# Patient Record
Sex: MALE | Race: BLACK OR AFRICAN AMERICAN | NOT HISPANIC OR LATINO | URBAN - METROPOLITAN AREA
[De-identification: names, ages, dates, MRNs, and addresses within clinical notes are randomized per-mention and may not be internally consistent; named-entity substitution may affect disease eponyms.]

---

## 2018-02-21 ENCOUNTER — EMERGENCY (EMERGENCY)
Facility: HOSPITAL | Age: 38
LOS: 1 days | Discharge: HOME | End: 2018-02-21
Attending: EMERGENCY MEDICINE | Admitting: EMERGENCY MEDICINE
Payer: MEDICAID

## 2018-02-21 VITALS
DIASTOLIC BLOOD PRESSURE: 87 MMHG | RESPIRATION RATE: 16 BRPM | HEART RATE: 104 BPM | OXYGEN SATURATION: 100 % | SYSTOLIC BLOOD PRESSURE: 148 MMHG | TEMPERATURE: 100 F

## 2018-02-21 VITALS
OXYGEN SATURATION: 98 % | HEART RATE: 82 BPM | SYSTOLIC BLOOD PRESSURE: 131 MMHG | TEMPERATURE: 99 F | RESPIRATION RATE: 18 BRPM | DIASTOLIC BLOOD PRESSURE: 81 MMHG

## 2018-02-21 PROBLEM — Z00.00 ENCOUNTER FOR PREVENTIVE HEALTH EXAMINATION: Status: ACTIVE | Noted: 2018-02-21

## 2018-02-21 LAB
ALBUMIN SERPL ELPH-MCNC: 4.4 G/DL — SIGNIFICANT CHANGE UP (ref 3.3–5)
ALP SERPL-CCNC: 66 U/L — SIGNIFICANT CHANGE UP (ref 40–120)
ALT FLD-CCNC: 32 U/L — SIGNIFICANT CHANGE UP (ref 4–41)
AST SERPL-CCNC: 27 U/L — SIGNIFICANT CHANGE UP (ref 4–40)
BASOPHILS # BLD AUTO: 0.04 K/UL — SIGNIFICANT CHANGE UP (ref 0–0.2)
BASOPHILS NFR BLD AUTO: 0.9 % — SIGNIFICANT CHANGE UP (ref 0–2)
BASOPHILS NFR SPEC: 0 % — SIGNIFICANT CHANGE UP (ref 0–2)
BILIRUB SERPL-MCNC: 0.4 MG/DL — SIGNIFICANT CHANGE UP (ref 0.2–1.2)
BUN SERPL-MCNC: 11 MG/DL — SIGNIFICANT CHANGE UP (ref 7–23)
CALCIUM SERPL-MCNC: 8.7 MG/DL — SIGNIFICANT CHANGE UP (ref 8.4–10.5)
CHLORIDE SERPL-SCNC: 101 MMOL/L — SIGNIFICANT CHANGE UP (ref 98–107)
CO2 SERPL-SCNC: 26 MMOL/L — SIGNIFICANT CHANGE UP (ref 22–31)
CREAT SERPL-MCNC: 1.19 MG/DL — SIGNIFICANT CHANGE UP (ref 0.5–1.3)
EOSINOPHIL # BLD AUTO: 0.15 K/UL — SIGNIFICANT CHANGE UP (ref 0–0.5)
EOSINOPHIL NFR BLD AUTO: 3.3 % — SIGNIFICANT CHANGE UP (ref 0–6)
EOSINOPHIL NFR FLD: 6.1 % — HIGH (ref 0–6)
GIANT PLATELETS BLD QL SMEAR: PRESENT — SIGNIFICANT CHANGE UP
GLUCOSE SERPL-MCNC: 98 MG/DL — SIGNIFICANT CHANGE UP (ref 70–99)
HCT VFR BLD CALC: 47.3 % — SIGNIFICANT CHANGE UP (ref 39–50)
HGB BLD-MCNC: 16.1 G/DL — SIGNIFICANT CHANGE UP (ref 13–17)
IMM GRANULOCYTES # BLD AUTO: 0.01 # — SIGNIFICANT CHANGE UP
IMM GRANULOCYTES NFR BLD AUTO: 0.2 % — SIGNIFICANT CHANGE UP (ref 0–1.5)
LYMPHOCYTES # BLD AUTO: 1.48 K/UL — SIGNIFICANT CHANGE UP (ref 1–3.3)
LYMPHOCYTES # BLD AUTO: 32.6 % — SIGNIFICANT CHANGE UP (ref 13–44)
LYMPHOCYTES NFR SPEC AUTO: 26.3 % — SIGNIFICANT CHANGE UP (ref 13–44)
MANUAL SMEAR VERIFICATION: SIGNIFICANT CHANGE UP
MCHC RBC-ENTMCNC: 30 PG — SIGNIFICANT CHANGE UP (ref 27–34)
MCHC RBC-ENTMCNC: 34 % — SIGNIFICANT CHANGE UP (ref 32–36)
MCV RBC AUTO: 88.1 FL — SIGNIFICANT CHANGE UP (ref 80–100)
MONOCYTES # BLD AUTO: 0.96 K/UL — HIGH (ref 0–0.9)
MONOCYTES NFR BLD AUTO: 21.1 % — HIGH (ref 2–14)
MONOCYTES NFR BLD: 15.8 % — HIGH (ref 2–9)
MORPHOLOGY BLD-IMP: NORMAL — SIGNIFICANT CHANGE UP
NEUTROPHIL AB SER-ACNC: 42.1 % — LOW (ref 43–77)
NEUTROPHILS # BLD AUTO: 1.9 K/UL — SIGNIFICANT CHANGE UP (ref 1.8–7.4)
NEUTROPHILS NFR BLD AUTO: 41.9 % — LOW (ref 43–77)
NRBC # FLD: 0 — SIGNIFICANT CHANGE UP
PLATELET # BLD AUTO: 141 K/UL — LOW (ref 150–400)
PLATELET COUNT - ESTIMATE: SIGNIFICANT CHANGE UP
PMV BLD: 10.8 FL — SIGNIFICANT CHANGE UP (ref 7–13)
POTASSIUM SERPL-MCNC: 4 MMOL/L — SIGNIFICANT CHANGE UP (ref 3.5–5.3)
POTASSIUM SERPL-SCNC: 4 MMOL/L — SIGNIFICANT CHANGE UP (ref 3.5–5.3)
PROT SERPL-MCNC: 7.9 G/DL — SIGNIFICANT CHANGE UP (ref 6–8.3)
RBC # BLD: 5.37 M/UL — SIGNIFICANT CHANGE UP (ref 4.2–5.8)
RBC # FLD: 13.2 % — SIGNIFICANT CHANGE UP (ref 10.3–14.5)
SODIUM SERPL-SCNC: 140 MMOL/L — SIGNIFICANT CHANGE UP (ref 135–145)
VARIANT LYMPHS # BLD: 9.7 % — SIGNIFICANT CHANGE UP
WBC # BLD: 4.54 K/UL — SIGNIFICANT CHANGE UP (ref 3.8–10.5)
WBC # FLD AUTO: 4.54 K/UL — SIGNIFICANT CHANGE UP (ref 3.8–10.5)

## 2018-02-21 PROCEDURE — 71046 X-RAY EXAM CHEST 2 VIEWS: CPT | Mod: 26

## 2018-02-21 PROCEDURE — 99284 EMERGENCY DEPT VISIT MOD MDM: CPT

## 2018-02-21 RX ORDER — DEXAMETHASONE 0.5 MG/5ML
5 ELIXIR ORAL ONCE
Qty: 0 | Refills: 0 | Status: COMPLETED | OUTPATIENT
Start: 2018-02-21 | End: 2018-02-21

## 2018-02-21 RX ORDER — DIPHENHYDRAMINE HCL 50 MG
25 CAPSULE ORAL ONCE
Qty: 0 | Refills: 0 | Status: COMPLETED | OUTPATIENT
Start: 2018-02-21 | End: 2018-02-21

## 2018-02-21 RX ORDER — SODIUM CHLORIDE 9 MG/ML
1000 INJECTION INTRAMUSCULAR; INTRAVENOUS; SUBCUTANEOUS ONCE
Qty: 0 | Refills: 0 | Status: COMPLETED | OUTPATIENT
Start: 2018-02-21 | End: 2018-02-21

## 2018-02-21 RX ORDER — KETOROLAC TROMETHAMINE 30 MG/ML
30 SYRINGE (ML) INJECTION ONCE
Qty: 0 | Refills: 0 | Status: DISCONTINUED | OUTPATIENT
Start: 2018-02-21 | End: 2018-02-21

## 2018-02-21 RX ADMIN — Medication 25 MILLIGRAM(S): at 16:10

## 2018-02-21 RX ADMIN — Medication 30 MILLIGRAM(S): at 16:10

## 2018-02-21 RX ADMIN — Medication 5 MILLIGRAM(S): at 16:09

## 2018-02-21 RX ADMIN — SODIUM CHLORIDE 2000 MILLILITER(S): 9 INJECTION INTRAMUSCULAR; INTRAVENOUS; SUBCUTANEOUS at 16:10

## 2018-02-21 NOTE — ED PROVIDER NOTE - PLAN OF CARE
Seen in ED for cough, likely viral upper respiratory tract infection.  Please drink plenty of fluids and follow the instructions below:   1)	If you have fever greater than 100F or generalized bodyaches then please take Tylenol 650 mg every 4 hours and Motrin 800 mg every 6 hours.   2)	If you have head congestion, sinus pressure, or nasal congestion then please take Allegra 120-180 mg every 24 hours or Zyrtec use as directed.   3)	If you have throat discomfort please take cough drops, Cepacol, or Chloraseptic spray.   4)	If you have persistent dry cough please take Delsym or Robitussin.   5)	If you have difficulty bringing up mucus please take Mucinex use as directed.   6)	Make a follow-up appointment with your primary doctor.   7)	Return to ED for any new or worsening symptoms.

## 2018-02-21 NOTE — ED PROVIDER NOTE - CARE PLAN
Principal Discharge DX:	Viral upper respiratory tract infection  Assessment and plan of treatment:	Seen in ED for cough, likely viral upper respiratory tract infection.  Please drink plenty of fluids and follow the instructions below:   1)	If you have fever greater than 100F or generalized bodyaches then please take Tylenol 650 mg every 4 hours and Motrin 800 mg every 6 hours.   2)	If you have head congestion, sinus pressure, or nasal congestion then please take Allegra 120-180 mg every 24 hours or Zyrtec use as directed.   3)	If you have throat discomfort please take cough drops, Cepacol, or Chloraseptic spray.   4)	If you have persistent dry cough please take Delsym or Robitussin.   5)	If you have difficulty bringing up mucus please take Mucinex use as directed.   6)	Make a follow-up appointment with your primary doctor.   7)	Return to ED for any new or worsening symptoms.

## 2018-02-21 NOTE — ED PROVIDER NOTE - ATTENDING CONTRIBUTION TO CARE
Dr. Aleman: I performed the initial face to face bedside interview with this patient regarding history of present illness, review of symptoms and past medical, social and family history.  I completed an independent physical examination.  I was the initial provider who evaluated this patient.  The history, review of symptoms and examination was documented by the scribe in my presence and I attest to the accuracy of the documentation.  I have signed out the follow up of any pending tests (i.e. labs, radiological studies) to the PA.  I have discussed the patient’s plan of care and disposition with the PA.

## 2018-02-21 NOTE — ED ADULT NURSE NOTE - OBJECTIVE STATEMENT
Pt rcvd in surge, aox3, ambulatory, c/o flu-like symptoms since Saturday, cough, fevers, body aches. Pt denies any chest pain, SOB. MD [place] done, 20G placed on R AC, labs sent,, meds given, moved to RW, nad.

## 2018-02-21 NOTE — ED PROVIDER NOTE - OBJECTIVE STATEMENT
15:16 Ash att: 37M neg pmh c/o fever, cough, and body aches x 3-4 days. Past four days patient notes fever tmax 101-102, taking motrin 400 mg q4h with some relief. Patient notes non productive cough, improvement with Nyquil and mucinex. Denies sob, exertional dyspnea, asthma, smoking, travel, travel contact, sick contact, recent abx or hospitalization. PMH x PSH x MED x ALL x SMOKE PCP PHARMACY

## 2018-02-21 NOTE — ED PROVIDER NOTE - PROGRESS NOTE DETAILS
GARY Brock: Labs and xray reviewed with Dr. Aleman. No signs of PNA. Pt stable for discharge and to follow up with pmd.

## 2018-03-23 ENCOUNTER — EMERGENCY (EMERGENCY)
Facility: HOSPITAL | Age: 38
LOS: 1 days | Discharge: HOME | End: 2018-03-23
Attending: EMERGENCY MEDICINE | Admitting: EMERGENCY MEDICINE
Payer: MEDICAID

## 2018-03-23 VITALS
SYSTOLIC BLOOD PRESSURE: 148 MMHG | TEMPERATURE: 98 F | RESPIRATION RATE: 16 BRPM | DIASTOLIC BLOOD PRESSURE: 86 MMHG | HEART RATE: 80 BPM | OXYGEN SATURATION: 100 %

## 2018-03-23 PROCEDURE — 99284 EMERGENCY DEPT VISIT MOD MDM: CPT

## 2018-03-23 NOTE — ED ADULT TRIAGE NOTE - CHIEF COMPLAINT QUOTE
Pt walk in c/o Pain on Left Knee and Back area for 2 weeks. Swelling Warm to touch and Pain is worse today. Travel to [place] last week, Denies Trauma

## 2018-03-24 VITALS
OXYGEN SATURATION: 100 % | RESPIRATION RATE: 16 BRPM | SYSTOLIC BLOOD PRESSURE: 124 MMHG | TEMPERATURE: 98 F | DIASTOLIC BLOOD PRESSURE: 75 MMHG | HEART RATE: 78 BPM

## 2018-03-24 PROCEDURE — 93971 EXTREMITY STUDY: CPT | Mod: 26,LT

## 2018-03-24 PROCEDURE — 73564 X-RAY EXAM KNEE 4 OR MORE: CPT | Mod: 26,LT

## 2018-03-24 RX ORDER — KETOROLAC TROMETHAMINE 30 MG/ML
30 SYRINGE (ML) INJECTION ONCE
Qty: 0 | Refills: 0 | Status: DISCONTINUED | OUTPATIENT
Start: 2018-03-24 | End: 2018-03-24

## 2018-03-24 RX ORDER — ACETAMINOPHEN 500 MG
650 TABLET ORAL ONCE
Qty: 0 | Refills: 0 | Status: COMPLETED | OUTPATIENT
Start: 2018-03-24 | End: 2018-03-24

## 2018-03-24 RX ADMIN — Medication 650 MILLIGRAM(S): at 00:32

## 2018-03-24 RX ADMIN — Medication 30 MILLIGRAM(S): at 00:32

## 2018-03-24 NOTE — ED PROVIDER NOTE - ATTENDING CONTRIBUTION TO CARE
Dr. Dan:  I have personally performed a face to face bedside history and physical examination of this patient. I have discussed the history, examination, review of systems, assessment and plan of management with the resident. I have reviewed the electronic medical record and amended it to reflect my history, review of systems, physical exam, assessment and plan.    38M denies pmh presents with two weeks of left knee pain and swelling.  Had recent travel to [place] and cruise to [place].  Denies specific trauma although recent increase in cycling activity. ROS otherwise negative    Exam:  - TTP at popliteal fossa, mild effusion at left knee, no calf tenderness, distally neurovascularly intact; able to range joint, no erythema/warmth    A/P  - left knee pain  - XR, US to r/o DVT

## 2018-03-24 NOTE — ED ADULT NURSE NOTE - CHPI ED SYMPTOMS NEG
no tingling/no dizziness/no weakness/no chills/no decreased eating/drinking/no fever/no numbness/no vomiting/no nausea

## 2018-03-24 NOTE — ED ADULT NURSE NOTE - OBJECTIVE STATEMENT
received to room 13. complains of left knee pain and swelling for past 2 weeks. states thinks injured knee while running on treadmill. pain is worse with movement but has full ROM. denies any long flights/car rides, calf pain/swelling, chest pain, sob. awaits xray in no acute distress with family at bedside.

## 2018-03-24 NOTE — ED PROVIDER NOTE - OBJECTIVE STATEMENT
37yo male with no pmh presents with left knee pain for 2 weeks. Pt states for the past 2 weeks started to have left knee pain, swollen past couple of days, worse earlier today. Pt states pain at pop fossa. Pt states he went to [place] and cruise to [place]. Pt has taken tylenol and ibuprofen for the pain. Pt denies trauma but states a couple of weeks ago was cycling. Pt denies fevers/chills, all other symptoms, States he placed compression stocking which decreased the swelling.

## 2018-03-24 NOTE — ED ADULT NURSE NOTE - CHIEF COMPLAINT QUOTE
Pt walk in c/o Pain on Left Knee and Back area for 2 weeks. Swelling Warm to touch and Pain is worse today. Travel to [place] last week, Denies Trauma

## 2019-05-12 ENCOUNTER — EMERGENCY (EMERGENCY)
Facility: HOSPITAL | Age: 39
LOS: 1 days | Discharge: HOME | End: 2019-05-12
Attending: EMERGENCY MEDICINE | Admitting: EMERGENCY MEDICINE
Payer: MEDICAID

## 2019-05-12 VITALS
DIASTOLIC BLOOD PRESSURE: 84 MMHG | HEART RATE: 71 BPM | OXYGEN SATURATION: 100 % | SYSTOLIC BLOOD PRESSURE: 151 MMHG | TEMPERATURE: 98 F | RESPIRATION RATE: 14 BRPM

## 2019-05-12 VITALS
SYSTOLIC BLOOD PRESSURE: 146 MMHG | WEIGHT: 250 LBS | RESPIRATION RATE: 16 BRPM | OXYGEN SATURATION: 100 % | DIASTOLIC BLOOD PRESSURE: 96 MMHG | HEART RATE: 92 BPM | TEMPERATURE: 98 F | HEIGHT: 70 IN

## 2019-05-12 LAB
APPEARANCE UR: SIGNIFICANT CHANGE UP
BILIRUB UR-MCNC: NEGATIVE — SIGNIFICANT CHANGE UP
BLOOD UR QL VISUAL: SIGNIFICANT CHANGE UP
COLOR SPEC: YELLOW — SIGNIFICANT CHANGE UP
GLUCOSE UR-MCNC: NEGATIVE — SIGNIFICANT CHANGE UP
KETONES UR-MCNC: NEGATIVE — SIGNIFICANT CHANGE UP
LEUKOCYTE ESTERASE UR-ACNC: HIGH
NITRITE UR-MCNC: NEGATIVE — SIGNIFICANT CHANGE UP
PH UR: 7 — SIGNIFICANT CHANGE UP (ref 5–8)
PROT UR-MCNC: 100 — HIGH
RBC CASTS # UR COMP ASSIST: SIGNIFICANT CHANGE UP (ref 0–?)
SP GR SPEC: 1.02 — SIGNIFICANT CHANGE UP (ref 1–1.04)
UROBILINOGEN FLD QL: 2 — SIGNIFICANT CHANGE UP
WBC UR QL: >50 — HIGH (ref 0–?)

## 2019-05-12 PROCEDURE — 76870 US EXAM SCROTUM: CPT | Mod: 26

## 2019-05-12 PROCEDURE — 99284 EMERGENCY DEPT VISIT MOD MDM: CPT | Mod: 25

## 2019-05-12 RX ORDER — ACETAMINOPHEN 500 MG
975 TABLET ORAL ONCE
Refills: 0 | Status: COMPLETED | OUTPATIENT
Start: 2019-05-12 | End: 2019-05-12

## 2019-05-12 RX ORDER — CEFTRIAXONE 500 MG/1
250 INJECTION, POWDER, FOR SOLUTION INTRAMUSCULAR; INTRAVENOUS ONCE
Refills: 0 | Status: COMPLETED | OUTPATIENT
Start: 2019-05-12 | End: 2019-05-12

## 2019-05-12 RX ORDER — AZITHROMYCIN 500 MG/1
1000 TABLET, FILM COATED ORAL ONCE
Refills: 0 | Status: COMPLETED | OUTPATIENT
Start: 2019-05-12 | End: 2019-05-12

## 2019-05-12 RX ORDER — OXYCODONE HYDROCHLORIDE 5 MG/1
5 TABLET ORAL ONCE
Refills: 0 | Status: DISCONTINUED | OUTPATIENT
Start: 2019-05-12 | End: 2019-05-12

## 2019-05-12 RX ADMIN — OXYCODONE HYDROCHLORIDE 5 MILLIGRAM(S): 5 TABLET ORAL at 04:16

## 2019-05-12 RX ADMIN — OXYCODONE HYDROCHLORIDE 5 MILLIGRAM(S): 5 TABLET ORAL at 03:04

## 2019-05-12 RX ADMIN — Medication 975 MILLIGRAM(S): at 03:04

## 2019-05-12 RX ADMIN — AZITHROMYCIN 1000 MILLIGRAM(S): 500 TABLET, FILM COATED ORAL at 06:40

## 2019-05-12 RX ADMIN — Medication 975 MILLIGRAM(S): at 04:16

## 2019-05-12 RX ADMIN — CEFTRIAXONE 250 MILLIGRAM(S): 500 INJECTION, POWDER, FOR SOLUTION INTRAMUSCULAR; INTRAVENOUS at 06:40
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
## 2019-05-12 NOTE — ED PROVIDER NOTE - NSFOLLOWUPINSTRUCTIONS_ED_ALL_ED_FT
1) Please return to the ED should you have any new or worsening symptoms, worsening pain, develop chest pain, difficulty breathing, or any concerning symptoms or acutely worsening pain   2) Please follow up with [facility] in 2-3 days. Please call [phone] to make an appointment.
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
## 2019-05-12 NOTE — ED PROVIDER NOTE - CLINICAL SUMMARY MEDICAL DECISION MAKING FREE TEXT BOX
Alessandro Adamson (Resident): 37 y/o L testicular pain w/ hemospermia - concern for trauma vs infection (orchitis vs epididymitis) - patient looks comfortable at rest, but exquisitely tender testicle w/o much skin changes/bruising - low suspicion for sono given no pain at rest - will pain control, urine, and sono

## 2019-05-12 NOTE — ED ADULT TRIAGE NOTE - CHIEF COMPLAINT QUOTE
Pt arrives to ED c/o swollen left testicle.  Pt reports 10/10 pain and appears uncomfortable in triage.  Pt reports burning during micturition but denies any discharge.  Pt reports pain began four days ago and has gotten progressively worse.  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

## 2019-05-12 NOTE — ED PROVIDER NOTE - PROGRESS NOTE DETAILS
Alessandro Adamson (Resident): US read w/ possible torsion/detorsion w/ swelling of spermatic cord - patient still in pain, worse w/ any movement - will rx for GC/Chlamydia - declined HIV test but accepted RPR - paged urology Alessandro Adamson (Resident): US read w/ possible torsion/detorsion w/ swelling of spermatic cord - patient still in pain but improved - will rx for GC/Chlamydia - declined HIV test but accepted RPR - patient US does show good flow to the testicle, and had urology look at images, and they state that this active infection could cause the non-specific findings of the twisting cord, but the radiologist would not be able to suggest possible torsion/detorsion based on that image and since patient has active flow to testicle, no concern for torsion - recommend patient returns for any acutely worse pain or if symptoms do not improved - provided patient w/ urology follow up - safe to d/c home Alessandro Adamson (Resident): US read w/ possible torsion/detorsion w/ swelling of spermatic cord - patient still in pain but improved - will rx for GC/Chlamydia - ofered patient IM rocephin plus 10 days of doxy, but states he does not think he would remember to take pills for 10 days, so will do azithromycin 1g PO now - declined HIV test but accepted RPR - patient US does show good flow to the testicle, and had urology look at images, and they state that this active infection could cause the non-specific findings of the twisting cord, but the radiologist would not be able to suggest possible torsion/detorsion based on that image and since patient has active flow to testicle, no concern for torsion, especially in setting of active infection - recommend patient returns for any acutely worse pain or if symptoms do not improved - provided patient w/ urology follow up - safe to d/c home - patient understands everything, given chance to ask questions - safe to d/c home Alessandro Adamson (Resident): US read w/ possible torsion/detorsion w/ swelling of spermatic cord - patient still in pain but improved - will rx for GC/Chlamydia - offered patient IM rocephin plus 10 days of doxy, but states he does not think he would remember to take pills for 10 days, so will do azithromycin 1g PO now - declined HIV test but accepted RPR - patient US does show good flow to the testicle, and had urology look at images, and they state that this active infection could cause the non-specific findings of the twisting cord, but the radiologist would not be able to suggest possible torsion/detorsion based on that image and since patient has active flow to testicle, no concern for torsion, especially in setting of active infection - recommend patient returns for any acutely worse pain or if symptoms do not improved - provided patient w/ urology follow up - safe to d/c home - patient understands everything, given chance to ask questions - safe to d/c home

## 2019-05-12 NOTE — ED PROVIDER NOTE - GENITOURINARY, MLM
L testicular swelling, Pain at inferior pole and posterior testicle and pain at inguinal canal. No penile discharge. Circumcised

## 2019-05-12 NOTE — ED ADULT NURSE NOTE - OBJECTIVE STATEMENT
Pt A&ox4, ambulatory, arrives to ED room 22 c/o L testicular pain. pt reports pain increasing, with associated swelling. Intermittent pain with urination. Denies fevers, chills, discharge, hematuria. Pt states pain is constant, however pt appears significantly more uncomfortable with movement. pt was evaluated by ED provider. VSS. Medicated as ordered. Urine samples sent to lab. Pt pending US.

## 2019-05-13 LAB
BACTERIA UR CULT: SIGNIFICANT CHANGE UP
C TRACH RRNA SPEC QL NAA+PROBE: SIGNIFICANT CHANGE UP
N GONORRHOEA RRNA SPEC QL NAA+PROBE: SIGNIFICANT CHANGE UP
SPECIMEN SOURCE: SIGNIFICANT CHANGE UP
SPECIMEN SOURCE: SIGNIFICANT CHANGE UP
T PALLIDUM AB TITR SER: NEGATIVE — SIGNIFICANT CHANGE UP

## 2019-05-14 ENCOUNTER — APPOINTMENT (OUTPATIENT)
Dept: UROLOGY | Facility: CLINIC | Age: 39
End: 2019-05-14
Payer: MEDICAID

## 2019-05-14 ENCOUNTER — LABORATORY RESULT (OUTPATIENT)
Age: 39
End: 2019-05-14

## 2019-05-14 ENCOUNTER — OUTPATIENT (OUTPATIENT)
Dept: OUTPATIENT SERVICES | Facility: HOSPITAL | Age: 39
LOS: 1 days | End: 2019-05-14
Payer: MEDICAID

## 2019-05-14 VITALS — RESPIRATION RATE: 18 BRPM | DIASTOLIC BLOOD PRESSURE: 116 MMHG | SYSTOLIC BLOOD PRESSURE: 176 MMHG | HEART RATE: 93 BPM

## 2019-05-14 DIAGNOSIS — N50.9 DISORDER OF MALE GENITAL ORGANS, UNSPECIFIED: ICD-10-CM

## 2019-05-14 DIAGNOSIS — R35.0 FREQUENCY OF MICTURITION: ICD-10-CM

## 2019-05-14 DIAGNOSIS — R36.1 HEMATOSPERMIA: ICD-10-CM

## 2019-05-14 PROCEDURE — 93975 VASCULAR STUDY: CPT | Mod: 26

## 2019-05-14 PROCEDURE — 99204 OFFICE O/P NEW MOD 45 MIN: CPT | Mod: 25

## 2019-05-14 PROCEDURE — 93975 VASCULAR STUDY: CPT

## 2019-05-14 PROCEDURE — 76870 US EXAM SCROTUM: CPT

## 2019-05-14 PROCEDURE — 76870 US EXAM SCROTUM: CPT | Mod: 26

## 2019-05-14 NOTE — HISTORY OF PRESENT ILLNESS
[FreeTextEntry1] : Patient is a 31-year-old gentleman who presents with the chief complaint of had a large left hemiscrotum for evaluation. I reviewed the questionnaire he had completed with him in detail. Patient had relations with 2 women last week and was concerned about sexually transmitted disease. He had an incident of hematospermia on Friday evening. He was seen in the emergency room on Saturday morning and STD testing was negative. However, he stated that ultrasound done in the ER was positive. He was told that might have been a torsion or missed torsion. He persists with large left hemiscrotum. He has no known drug allergies.  His past medical history demonstrates no significant urologic issues.  In his present occupation he has no known toxin exposure.  He does smoke and drinks only socially.  He has no known drug allergies.  His review of systems is non-contributory. His family history is not significant.\par

## 2019-05-14 NOTE — ASSESSMENT
[FreeTextEntry1] : This pleasant gentleman presents with surgeons regarding an enlarged left hemiscrotum and hematospermia.  I have requested several baseline blood studies and additional imaging.   Urine analysis was requested.  I will make more specific recommendations after the results of the requested tests return.\par

## 2019-05-14 NOTE — PHYSICAL EXAM
[General Appearance - Well Developed] : well developed [General Appearance - Well Nourished] : well nourished [Normal Appearance] : normal appearance [Well Groomed] : well groomed [General Appearance - In No Acute Distress] : no acute distress [Heart Rate And Rhythm] : Heart rate and rhythm were normal [Arterial Pulses Normal] : the pedal pulses were normal [Edema] : no peripheral edema [Respiration, Rhythm And Depth] : normal respiratory rhythm and effort [Exaggerated Use Of Accessory Muscles For Inspiration] : no accessory muscle use [Chest Palpation] : palpation of the chest revealed no abnormalities [Bowel Sounds] : normal bowel sounds [Auscultation Breath Sounds / Voice Sounds] : lungs were clear to auscultation bilaterally [Abdomen Soft] : soft [Abdomen Tenderness] : non-tender [Abdomen Hernia] : no hernia was discovered [Abdomen Mass (___ Cm)] : no abdominal mass palpated [Costovertebral Angle Tenderness] : no ~M costovertebral angle tenderness [Urinary Bladder Findings] : the bladder was normal on palpation [Scrotum] : the scrotum was normal [Urethral Meatus] : meatus normal [Testes Tenderness] : no tenderness of the testes [Rectal Exam - Seminal Vesicles] : the seminal vesicles were normal [Epididymis] : the epididymides were normal [Rectal Exam - Rectum] : digital rectal exam was normal [Testes Mass (___cm)] : there were no testicular masses [Anus Abnormality] : the anus and perineum were normal [Prostate Enlargement] : the prostate was not enlarged [Prostate Tenderness] : the prostate was not tender [No Prostate Nodules] : no prostate nodules [Skin Color & Pigmentation] : normal skin color and pigmentation [Normal Station and Gait] : the gait and station were normal for the patient's age [No Focal Deficits] : no focal deficits [Skin Lesions] : no skin lesions [] : no rash [Skin Turgor] : supple [Motor Exam] : the motor exam was normal [Sensation] : the sensory exam was normal to light touch and pinprick [Affect] : the affect was normal [Mood] : the mood was normal [Oriented To Time, Place, And Person] : oriented to person, place, and time [Not Anxious] : not anxious [Cervical Lymph Nodes Enlarged Posterior Bilaterally] : posterior cervical [No Palpable Adenopathy] : no palpable adenopathy [Femoral Lymph Nodes Enlarged Bilaterally] : femoral [Axillary Lymph Nodes Enlarged Bilaterally] : axillary [Supraclavicular Lymph Nodes Enlarged Bilaterally] : supraclavicular [Cervical Lymph Nodes Enlarged Anterior Bilaterally] : anterior cervical [Inguinal Lymph Nodes Enlarged Bilaterally] : inguinal

## 2019-05-14 NOTE — LETTER BODY
[FreeTextEntry1] : Dear   ,\par \par Thank you for referring your patient Liu Barahona for consultation for left scrotal discomfort and swelling. I have requested several baseline blood studies. I will see the patient back in followup shortly and make further recommendations. I have attached a copy of my consultation note for your records.\par \par Thank you again for this kind referral. I will certainly keep you updated with further progress. Please do not hesitate to call me if you have any questions.\par \par Best regards,\par \par \par \par Jaden Delarosa M.D., PhD\par Professor of Urology\par    [facility]\par Director, Reproductive and Sexual Medicine\par    [facility]\par    [facility]\par

## 2019-05-15 ENCOUNTER — LABORATORY RESULT (OUTPATIENT)
Age: 39
End: 2019-05-15

## 2019-05-17 LAB
ALBUMIN SERPL ELPH-MCNC: 4.2 G/DL
ALP BLD-CCNC: 75 U/L
ALT SERPL-CCNC: 21 U/L
ANION GAP SERPL CALC-SCNC: 14 MMOL/L
APPEARANCE: CLEAR
AST SERPL-CCNC: 19 U/L
BASOPHILS # BLD AUTO: 0.04 K/UL
BASOPHILS NFR BLD AUTO: 0.4 %
BILIRUB SERPL-MCNC: 0.2 MG/DL
BILIRUBIN URINE: NEGATIVE
BLOOD URINE: ABNORMAL
BUN SERPL-MCNC: 11 MG/DL
C TRACH RRNA SPEC QL NAA+PROBE: NOT DETECTED
CALCIUM SERPL-MCNC: 9.1 MG/DL
CHLORIDE SERPL-SCNC: 105 MMOL/L
CHOLEST SERPL-MCNC: 152 MG/DL
CHOLEST/HDLC SERPL: 3.8 RATIO
CO2 SERPL-SCNC: 21 MMOL/L
COLOR: NORMAL
CREAT SERPL-MCNC: 0.87 MG/DL
EOSINOPHIL # BLD AUTO: 0.17 K/UL
EOSINOPHIL NFR BLD AUTO: 1.9 %
FSH SERPL-MCNC: 9.9 IU/L
GLUCOSE QUALITATIVE U: NEGATIVE
GLUCOSE SERPL-MCNC: 89 MG/DL
HCT VFR BLD CALC: 46 %
HDLC SERPL-MCNC: 40 MG/DL
HGB BLD-MCNC: 14.9 G/DL
HIV1+2 AB SPEC QL IA.RAPID: NONREACTIVE
IMM GRANULOCYTES NFR BLD AUTO: 0.3 %
KETONES URINE: NEGATIVE
LDLC SERPL CALC-MCNC: 91 MG/DL
LEUKOCYTE ESTERASE URINE: ABNORMAL
LH SERPL-ACNC: 10.1 IU/L
LYMPHOCYTES # BLD AUTO: 2.57 K/UL
LYMPHOCYTES NFR BLD AUTO: 28.7 %
MAN DIFF?: NORMAL
MCHC RBC-ENTMCNC: 28.9 PG
MCHC RBC-ENTMCNC: 32.4 GM/DL
MCV RBC AUTO: 89.1 FL
MONOCYTES # BLD AUTO: 0.78 K/UL
MONOCYTES NFR BLD AUTO: 8.7 %
N GONORRHOEA RRNA SPEC QL NAA+PROBE: NOT DETECTED
NEUTROPHILS # BLD AUTO: 5.38 K/UL
NEUTROPHILS NFR BLD AUTO: 60 %
NITRITE URINE: NEGATIVE
PH URINE: 7.5
PLATELET # BLD AUTO: 177 K/UL
POTASSIUM SERPL-SCNC: 4.1 MMOL/L
PROT SERPL-MCNC: 7.4 G/DL
PROTEIN URINE: NEGATIVE
PSA SERPL-MCNC: 2.4 NG/ML
RBC # BLD: 5.16 M/UL
RBC # FLD: 13.3 %
SODIUM SERPL-SCNC: 140 MMOL/L
SOURCE AMPLIFICATION: NORMAL
SPECIFIC GRAVITY URINE: 1.02
T PALLIDUM AB SER QL IA: NEGATIVE
TESTOST BND SERPL-MCNC: 5.9 PG/ML
TESTOST SERPL-MCNC: 419.2 NG/DL
TRIGL SERPL-MCNC: 105 MG/DL
TSH SERPL-ACNC: 2.84 UIU/ML
UROBILINOGEN URINE: NORMAL
WBC # FLD AUTO: 8.97 K/UL

## 2019-05-20 DIAGNOSIS — R36.1 HEMATOSPERMIA: ICD-10-CM

## 2019-05-20 DIAGNOSIS — N50.9 DISORDER OF MALE GENITAL ORGANS, UNSPECIFIED: ICD-10-CM

## 2019-05-30 ENCOUNTER — APPOINTMENT (OUTPATIENT)
Dept: UROLOGY | Facility: CLINIC | Age: 39
End: 2019-05-30

## 2020-03-19 NOTE — ED PROVIDER NOTE - TOBACCO USE
Gastro specimen pending results.         ----- Message from Radha Valenzuela sent at 3/19/2020  2:44 PM CDT -----  Contact: Patient  Type: Needs Medical Advice    Who Called:  Patient  Best Call Back Number:   Additional Information: Calling to speak with the nurse to get the results of her biopsy.      
Never smoker

## 2021-06-17 ENCOUNTER — EMERGENCY (EMERGENCY)
Facility: HOSPITAL | Age: 41
LOS: 1 days | Discharge: HOME | End: 2021-06-17
Attending: EMERGENCY MEDICINE | Admitting: EMERGENCY MEDICINE
Payer: MEDICAID

## 2021-06-17 VITALS
SYSTOLIC BLOOD PRESSURE: 127 MMHG | HEART RATE: 82 BPM | OXYGEN SATURATION: 97 % | RESPIRATION RATE: 17 BRPM | DIASTOLIC BLOOD PRESSURE: 74 MMHG | TEMPERATURE: 99 F

## 2021-06-17 VITALS
OXYGEN SATURATION: 100 % | SYSTOLIC BLOOD PRESSURE: 153 MMHG | WEIGHT: 265 LBS | RESPIRATION RATE: 16 BRPM | HEART RATE: 92 BPM | HEIGHT: 72 IN | TEMPERATURE: 99 F | DIASTOLIC BLOOD PRESSURE: 91 MMHG

## 2021-06-17 LAB
ALBUMIN SERPL ELPH-MCNC: 3.5 G/DL — SIGNIFICANT CHANGE UP (ref 3.3–5)
ALP SERPL-CCNC: 72 U/L — SIGNIFICANT CHANGE UP (ref 30–120)
ALT FLD-CCNC: 30 U/L DA — SIGNIFICANT CHANGE UP (ref 10–60)
ANION GAP SERPL CALC-SCNC: 9 MMOL/L — SIGNIFICANT CHANGE UP (ref 5–17)
APPEARANCE UR: CLEAR — SIGNIFICANT CHANGE UP
AST SERPL-CCNC: 36 U/L — SIGNIFICANT CHANGE UP (ref 10–40)
BACTERIA # UR AUTO: ABNORMAL
BASOPHILS # BLD AUTO: 0.04 K/UL — SIGNIFICANT CHANGE UP (ref 0–0.2)
BASOPHILS NFR BLD AUTO: 0.8 % — SIGNIFICANT CHANGE UP (ref 0–2)
BILIRUB SERPL-MCNC: 0.6 MG/DL — SIGNIFICANT CHANGE UP (ref 0.2–1.2)
BILIRUB UR-MCNC: NEGATIVE — SIGNIFICANT CHANGE UP
BUN SERPL-MCNC: 8 MG/DL — SIGNIFICANT CHANGE UP (ref 7–23)
CALCIUM SERPL-MCNC: 8.4 MG/DL — SIGNIFICANT CHANGE UP (ref 8.4–10.5)
CHLORIDE SERPL-SCNC: 101 MMOL/L — SIGNIFICANT CHANGE UP (ref 96–108)
CO2 SERPL-SCNC: 24 MMOL/L — SIGNIFICANT CHANGE UP (ref 22–31)
COLOR SPEC: YELLOW — SIGNIFICANT CHANGE UP
CREAT SERPL-MCNC: 1.1 MG/DL — SIGNIFICANT CHANGE UP (ref 0.5–1.3)
DIFF PNL FLD: ABNORMAL
EOSINOPHIL # BLD AUTO: 0.08 K/UL — SIGNIFICANT CHANGE UP (ref 0–0.5)
EOSINOPHIL NFR BLD AUTO: 1.5 % — SIGNIFICANT CHANGE UP (ref 0–6)
EPI CELLS # UR: SIGNIFICANT CHANGE UP
GLUCOSE SERPL-MCNC: 91 MG/DL — SIGNIFICANT CHANGE UP (ref 70–99)
GLUCOSE UR QL: NEGATIVE MG/DL — SIGNIFICANT CHANGE UP
HADV DNA SPEC QL NAA+PROBE: DETECTED
HCT VFR BLD CALC: 46.8 % — SIGNIFICANT CHANGE UP (ref 39–50)
HGB BLD-MCNC: 15.9 G/DL — SIGNIFICANT CHANGE UP (ref 13–17)
IMM GRANULOCYTES NFR BLD AUTO: 0.6 % — SIGNIFICANT CHANGE UP (ref 0–1.5)
KETONES UR-MCNC: NEGATIVE — SIGNIFICANT CHANGE UP
LEUKOCYTE ESTERASE UR-ACNC: ABNORMAL
LIDOCAIN IGE QN: 97 U/L — SIGNIFICANT CHANGE UP (ref 73–393)
LYMPHOCYTES # BLD AUTO: 1.58 K/UL — SIGNIFICANT CHANGE UP (ref 1–3.3)
LYMPHOCYTES # BLD AUTO: 30.5 % — SIGNIFICANT CHANGE UP (ref 13–44)
MCHC RBC-ENTMCNC: 29.5 PG — SIGNIFICANT CHANGE UP (ref 27–34)
MCHC RBC-ENTMCNC: 34 GM/DL — SIGNIFICANT CHANGE UP (ref 32–36)
MCV RBC AUTO: 86.8 FL — SIGNIFICANT CHANGE UP (ref 80–100)
MONOCYTES # BLD AUTO: 0.85 K/UL — SIGNIFICANT CHANGE UP (ref 0–0.9)
MONOCYTES NFR BLD AUTO: 16.4 % — HIGH (ref 2–14)
NEUTROPHILS # BLD AUTO: 2.6 K/UL — SIGNIFICANT CHANGE UP (ref 1.8–7.4)
NEUTROPHILS NFR BLD AUTO: 50.2 % — SIGNIFICANT CHANGE UP (ref 43–77)
NITRITE UR-MCNC: NEGATIVE — SIGNIFICANT CHANGE UP
NRBC # BLD: 0 /100 WBCS — SIGNIFICANT CHANGE UP (ref 0–0)
PH UR: 6 — SIGNIFICANT CHANGE UP (ref 5–8)
PLATELET # BLD AUTO: 118 K/UL — LOW (ref 150–400)
POTASSIUM SERPL-MCNC: 4.1 MMOL/L — SIGNIFICANT CHANGE UP (ref 3.5–5.3)
POTASSIUM SERPL-SCNC: 4.1 MMOL/L — SIGNIFICANT CHANGE UP (ref 3.5–5.3)
PROT SERPL-MCNC: 7.9 G/DL — SIGNIFICANT CHANGE UP (ref 6–8.3)
PROT UR-MCNC: 30 MG/DL
RAPID RVP RESULT: DETECTED
RBC # BLD: 5.39 M/UL — SIGNIFICANT CHANGE UP (ref 4.2–5.8)
RBC # FLD: 13.8 % — SIGNIFICANT CHANGE UP (ref 10.3–14.5)
RBC CASTS # UR COMP ASSIST: SIGNIFICANT CHANGE UP /HPF (ref 0–4)
SARS-COV-2 RNA SPEC QL NAA+PROBE: SIGNIFICANT CHANGE UP
SODIUM SERPL-SCNC: 134 MMOL/L — LOW (ref 135–145)
SP GR SPEC: 1.02 — SIGNIFICANT CHANGE UP (ref 1.01–1.02)
UROBILINOGEN FLD QL: NEGATIVE MG/DL — SIGNIFICANT CHANGE UP
WBC # BLD: 5.2 K/UL — SIGNIFICANT CHANGE UP (ref 3.8–10.5)
WBC # FLD AUTO: 5.2 K/UL — SIGNIFICANT CHANGE UP (ref 3.8–10.5)
WBC UR QL: NEGATIVE — SIGNIFICANT CHANGE UP

## 2021-06-17 PROCEDURE — 80053 COMPREHEN METABOLIC PANEL: CPT

## 2021-06-17 PROCEDURE — 96361 HYDRATE IV INFUSION ADD-ON: CPT

## 2021-06-17 PROCEDURE — 81001 URINALYSIS AUTO W/SCOPE: CPT

## 2021-06-17 PROCEDURE — 83690 ASSAY OF LIPASE: CPT

## 2021-06-17 PROCEDURE — 96375 TX/PRO/DX INJ NEW DRUG ADDON: CPT

## 2021-06-17 PROCEDURE — 87086 URINE CULTURE/COLONY COUNT: CPT

## 2021-06-17 PROCEDURE — 96374 THER/PROPH/DIAG INJ IV PUSH: CPT | Mod: XU

## 2021-06-17 PROCEDURE — 71046 X-RAY EXAM CHEST 2 VIEWS: CPT

## 2021-06-17 PROCEDURE — 85025 COMPLETE CBC W/AUTO DIFF WBC: CPT

## 2021-06-17 PROCEDURE — 36415 COLL VENOUS BLD VENIPUNCTURE: CPT

## 2021-06-17 PROCEDURE — 99284 EMERGENCY DEPT VISIT MOD MDM: CPT

## 2021-06-17 PROCEDURE — 74177 CT ABD & PELVIS W/CONTRAST: CPT

## 2021-06-17 PROCEDURE — 74177 CT ABD & PELVIS W/CONTRAST: CPT | Mod: 26,MA

## 2021-06-17 PROCEDURE — 99284 EMERGENCY DEPT VISIT MOD MDM: CPT | Mod: 25

## 2021-06-17 PROCEDURE — 71046 X-RAY EXAM CHEST 2 VIEWS: CPT | Mod: 26

## 2021-06-17 PROCEDURE — 0225U NFCT DS DNA&RNA 21 SARSCOV2: CPT

## 2021-06-17 RX ORDER — KETOROLAC TROMETHAMINE 30 MG/ML
15 SYRINGE (ML) INJECTION ONCE
Refills: 0 | Status: DISCONTINUED | OUTPATIENT
Start: 2021-06-17 | End: 2021-06-17

## 2021-06-17 RX ORDER — SODIUM CHLORIDE 9 MG/ML
1000 INJECTION INTRAMUSCULAR; INTRAVENOUS; SUBCUTANEOUS ONCE
Refills: 0 | Status: COMPLETED | OUTPATIENT
Start: 2021-06-17 | End: 2021-06-17

## 2021-06-17 RX ORDER — ONDANSETRON 8 MG/1
4 TABLET, FILM COATED ORAL ONCE
Refills: 0 | Status: COMPLETED | OUTPATIENT
Start: 2021-06-17 | End: 2021-06-17

## 2021-06-17 RX ORDER — ACETAMINOPHEN 500 MG
650 TABLET ORAL ONCE
Refills: 0 | Status: COMPLETED | OUTPATIENT
Start: 2021-06-17 | End: 2021-06-17

## 2021-06-17 RX ORDER — AMLODIPINE BESYLATE 2.5 MG/1
1 TABLET ORAL
Qty: 0 | Refills: 0 | DISCHARGE

## 2021-06-17 RX ADMIN — Medication 650 MILLIGRAM(S): at 12:09

## 2021-06-17 RX ADMIN — SODIUM CHLORIDE 1000 MILLILITER(S): 9 INJECTION INTRAMUSCULAR; INTRAVENOUS; SUBCUTANEOUS at 12:09

## 2021-06-17 RX ADMIN — Medication 650 MILLIGRAM(S): at 12:55

## 2021-06-17 RX ADMIN — ONDANSETRON 4 MILLIGRAM(S): 8 TABLET, FILM COATED ORAL at 12:09

## 2021-06-17 RX ADMIN — Medication 15 MILLIGRAM(S): at 12:55

## 2021-06-17 RX ADMIN — SODIUM CHLORIDE 1000 MILLILITER(S): 9 INJECTION INTRAMUSCULAR; INTRAVENOUS; SUBCUTANEOUS at 13:10

## 2021-06-17 RX ADMIN — Medication 15 MILLIGRAM(S): at 12:09

## 2021-06-17 NOTE — ED PROVIDER NOTE - CLINICAL SUMMARY MEDICAL DECISION MAKING FREE TEXT BOX
mult complaints including weakness, fatigue, diarrhea, and cough. low grade temp in ED. differentials include but not limited to covid, viral infection, pneumonia, colitis, diverticulitis, appendicitis. will check labs, CXR, CT abdomen, tylenol, IVF. no hypoxia, tachycardia, or tachypnea Cleo: mult complaints including weakness, fatigue, diarrhea, and cough. low grade temp in ED. differentials include but not limited to covid, viral infection, pneumonia, colitis, diverticulitis, appendicitis. will check labs, CXR, CT abdomen, tylenol, IVF. no hypoxia, tachycardia, or tachypnea.  ED work up shows possible LLL pna and possible inflammatory bowel disease for which outpatient GI referral is provided.

## 2021-06-17 NOTE — ED ADULT NURSE NOTE - CHPI ED NUR SYMPTOMS NEG
no chest pain/no chills/no diaphoresis/no edema/no headache/no hemoptysis/no shortness of breath/no wheezing

## 2021-06-17 NOTE — ED PROVIDER NOTE - PATIENT PORTAL LINK FT
You can access the FollowMyHealth Patient Portal offered by [facility] by registering at the following website: [email or web address]. By joining [facility]’s FollowMyHealth portal, you will also be able to view your health information using other applications (apps) compatible with our system.

## 2021-06-17 NOTE — ED PROVIDER NOTE - PROVIDER TOKENS
PROVIDER:[TOKEN:[[id number]],FOLLOWUP:[1-3 Days]] PROVIDER:[TOKEN:[[id number]],FOLLOWUP:[1-3 Days]],PROVIDER:[TOKEN:[[id number]],FOLLOWUP:[1-3 Days]]

## 2021-06-17 NOTE — ED PROVIDER NOTE - NSFOLLOWUPINSTRUCTIONS_ED_ALL_ED_FT
drink plenty of fluids, advance to BRAT diet as tolerated  continue zithromax that your started yesterday, also start Augmentin twice a day  tylenol or motrin over the counter for fever  have close follow up with primary care provider   follow up with GI regarding suspected finding of inflammatory bowel disease, referral provided         Adenovirus Infection, Adult      Adenoviruses are common viruses that cause many types of infections. These viruses may affect the nose, throat, windpipe, and lungs (respiratory system), as well as other parts of the body, including the eyes, stomach, bowels, bladder, and brain. The most common type of adenovirus infection is the common cold.    Usually, adenovirus infections are not severe. However, they can become severe in people who have another health problem that makes it hard to fight off infection.      What are the causes?  This condition is caused by an adenovirus entering your body. Some ways this can happen are:  •Touching a surface or object that has an adenovirus on it and then touching your mouth, nose, or eyes with unwashed hands.      •Coming in close physical contact with someone who has this type of infection. This may happen if you hug or shake hands with the person.      •Breathing in droplets that fly through the air when someone who has the infection talks, coughs, or sneezes.      •Having contact with stool (feces) that has the virus in it.      •Using a swimming pool that does not have enough chlorine in it. Chlorine is a chemical that kills germs.      Adenoviruses can live outside the body for a long time. They spread easily from person to person (are contagious).      What increases the risk?  The following factors may make you more likely to develop this condition:  •Spending a lot of time in places where there are many people. These include schools, summer camps, day care centers, community centers, and training centers for people who join the .      •Being an older adult.      •Having a weak immune system. This is the body's defense system.      •Having a disease of the respiratory system.      •Having a heart condition.        What are the signs or symptoms?  Adenovirus infections usually cause flu-like symptoms. When the virus enters the body, symptoms of this condition can take up to 14 days to develop. Symptoms may include:•Having lung and breathing problems, such as:  •Cough.      •Trouble breathing.      •Runny nose or stuffy (congested) nose.      •Feeling aches and pains, including:  •Headache.      •Stiff neck.      •Sore throat.      •Ear pain or congested ears.      •Stomachache.      •Having digestive problems, such as:  •Feeling nauseous or vomiting.      •Having diarrhea.        •Having a fever.      •Having eye problems, such as pink eye (conjunctivitis), causing inflammation and redness.      •Rash.    •Less common symptoms include:  •Being confused or not knowing the time of day or where you are (disoriented).      •Having blood in your urine or having pain while urinating.          How is this diagnosed?  This condition may be diagnosed based on your symptoms and a physical exam. Your health care provider may order tests to make sure your symptoms are not caused by another problem. Tests can include:  •Blood tests.      •Urine tests.      •Stool tests.      •Chest X-ray.      •Tests of tissue or mucus from your throat.        How is this treated?  This condition goes away on its own with time. Treatment for this condition involves managing symptoms until they go away. Your health care provider may recommend:  •Getting plenty of rest.      •Drinking more fluids than usual.      •Taking over-the-counter medicine to help relieve a sore throat, fever, or headache.        Follow these instructions at home:     Lifestyle     • Do not drink alcohol.      • Do not use any products that contain nicotine or tobacco, such as cigarettes, e-cigarettes, and chewing tobacco. If you need help quitting, ask your health care provider.      General instructions     •Take over-the-counter and prescription medicines only as told by your health care provider.      •Rest at home until your symptoms go away.      •Drink enough fluid to keep your urine pale yellow.      •If you have a sore throat, gargle with a salt-water mixture 3–4 times a day or as needed. To make a salt-water mixture, completely dissolve ½–1 tsp (3–6 g) of salt in 1 cup (237 mL) of warm water.      •Keep all follow-up visits as told by your health care provider. This is important.      •Return to your normal activities as told by your health care provider. Ask your health care provider what activities are safe for you.        How is this prevented?                Adenoviruses often are not killed by cleaning products and can remain on surfaces for a long time. To help prevent becoming infected or spreading infection:  •Wash your hands often with soap and water. If soap and water are not available, use hand .      •Cover your mouth when you cough. Cover your nose and mouth when you sneeze.      •Do not touch your eyes, nose, or mouth with unwashed hands, and wash hands after touching these areas.      •Clean commonly used objects often.      •Do not use a swimming pool that does not have enough chlorine in it.      •Avoid close contact with people who are sick.      •Do not go to school or work when you are sick.      •Do not share cups or eating utensils.        Where to find more information    •Centers for Disease Control and Prevention: www.cdc.gov        Contact a health care provider if:    •Your symptoms stay the same after 10 days.      •Your symptoms get worse.      •You cannot eat or drink without vomiting.        Get help right away if:    •You have trouble breathing or you are breathing quickly.      •Your skin, lips, or fingernails look blue.      •Your heart is beating fast.      •You become confused.      •You lose consciousness.      These symptoms may represent a serious problem that is an emergency. Do not wait to see if the symptoms will go away. Get medical help right away. Call your local emergency services (911 in the [place] Do not drive yourself to the hospital.       Summary    •The most common type of adenovirus infection is the common cold.      •This condition goes away on its own with time.      •Adenoviruses can live outside the body for a long time. They spread easily from person to person (are contagious).      •Rest at home until your symptoms go away.      •Contact a health care provider if your symptoms stay the same after 10 days.      This information is not intended to replace advice given to you by your health care provider. Make sure you discuss any questions you have with your health care provider.

## 2021-06-17 NOTE — ED PROVIDER NOTE - RECENT EXPOSURE TO
none known occurred while standing in a bar, gradual in onset, felt lightheaded and had to sit down.  Had brief witnessed LOC ~ 30 seconds.  No convulsions, no postictal state.  Felt nauseous but has no vomiting.  No abdominal pain, fever or chills.  Had colonoscopy yesterday.  [place] fine this morning but as per the  did not each much food during the day.  Denies alcohol use.  Hypotension in the field, normal HR.  No medication given prehospital. occurred while standing in a bar, gradual in onset, felt lightheaded and had to sit down.  Had brief witnessed LOC ~ 30 seconds.  No convulsions, no postictal state.  Felt nauseous but has no vomiting.  No abdominal pain, fever or chills.  Had colonoscopy yesterday at [facility] - took miralax as prep.  felt tired after the procedure and in the morning as well.  no abdominal pain, fever, vomiting.   As per the  did not each much food during the day.  Denies alcohol use.  Hypotension in the field, normal HR.  No medication given prehospital.

## 2021-06-17 NOTE — ED ADULT NURSE NOTE - NS_SISCREENINGSR_GEN_ALL_ED
End of shift note

Pt is a 71 yo female, A+Ox4, presenting to [facility] for ETOH dependence.  Pt has 
NKA, is on Full code status, and on Regular diet.  Pt is on Fall and Seizure precautions.  
Pt has HX of Anxiety, Depression, Knee replacement, Arthritis, and skin cancer.  Pt is on 
modified Ativan taper, tolerated well.  No PRN's given throughout shift.  Pt slept for a 
total of 9 HRS.  Last CIWA: 3 @0400.  No s/s of distress noted at this time.  Respirations 
even and unlabored.  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 Negative

## 2021-06-17 NOTE — ED ADULT TRIAGE NOTE - CHIEF COMPLAINT QUOTE
" I have abdominal pain, diarrhea, I feel weak, fatigued, back discomfort x 2 days, I started coughing this am, I had a negative rapid and regular test at [facility] center yesterday. I took Zithromax x 2 days, not prescribed "

## 2021-06-17 NOTE — ED PROVIDER NOTE - CARE PLAN
Principal Discharge DX:	Adenovirus infection  Secondary Diagnosis:	Pneumonia  Secondary Diagnosis:	Inflammatory bowel disease

## 2021-06-17 NOTE — ED PROVIDER NOTE - PROGRESS NOTE DETAILS
Reevaluated patient at bedside.  Patient feeling much improved.  eating [employer] without difficulty. no chest pain or shortness. or breath. Discussed the results of all diagnostic testing in ED and copies of all reports given.  already taking Zithromax. will also add Augmentin for broad spectrum coverage. symptomatic tx discussed. also recommend follow up with GI regarding finding of possible inflammatory bowel disease. referral provided.  An opportunity to ask questions was given.  Discussed the importance of prompt, close medical follow-up.  Patient will return with any changes, concerns or persistent / worsening symptoms.  Understanding of all instructions verbalized.

## 2021-06-17 NOTE — ED PROVIDER NOTE - OBJECTIVE STATEMENT
otherwise healthy 41 year old male presents with weakness, fatigue, diarrhea, back discomfort, and cough. patient was seen for physical 2 weeks ago. was told blood pressure was high (180/123). told to see PCP. has appt next week. was seen at ED in [place] 5 days ago for weakness and dizziness. blood pressure continued to be high and was started on amlodipine until seen by PCP (started yesterday). diarrhea started yesterday (4 times yesterday, twice today). denies abd pain, n/v. no urinary complaints. dry cough started yesterday. no chest pain or shortness of breath. reports diffuse upper back discomfort. continues to feel weak and fatigued. had rapid covid and PCR yesterday at [facility]abbing center and told was negative. not vaccinated. works as home health aide. no known covid exposures. denies fevers (temp 100.7 in triage)  PCP Dakota Calvo

## 2021-06-18 LAB
CULTURE RESULTS: NO GROWTH — SIGNIFICANT CHANGE UP
SPECIMEN SOURCE: SIGNIFICANT CHANGE UP

## 2021-07-27 ENCOUNTER — EMERGENCY (EMERGENCY)
Facility: HOSPITAL | Age: 41
LOS: 1 days | Discharge: HOME | End: 2021-07-27
Attending: STUDENT IN AN ORGANIZED HEALTH CARE EDUCATION/TRAINING PROGRAM | Admitting: STUDENT IN AN ORGANIZED HEALTH CARE EDUCATION/TRAINING PROGRAM
Payer: MEDICAID

## 2021-07-27 VITALS
SYSTOLIC BLOOD PRESSURE: 152 MMHG | DIASTOLIC BLOOD PRESSURE: 101 MMHG | OXYGEN SATURATION: 99 % | HEART RATE: 84 BPM | HEIGHT: 72 IN | RESPIRATION RATE: 16 BRPM | TEMPERATURE: 98 F

## 2021-07-27 LAB
ALBUMIN SERPL ELPH-MCNC: 4 G/DL — SIGNIFICANT CHANGE UP (ref 3.3–5)
ALP SERPL-CCNC: 75 U/L — SIGNIFICANT CHANGE UP (ref 40–120)
ALT FLD-CCNC: 20 U/L — SIGNIFICANT CHANGE UP (ref 4–41)
ANION GAP SERPL CALC-SCNC: 13 MMOL/L — SIGNIFICANT CHANGE UP (ref 7–14)
AST SERPL-CCNC: 20 U/L — SIGNIFICANT CHANGE UP (ref 4–40)
BASOPHILS # BLD AUTO: 0.04 K/UL — SIGNIFICANT CHANGE UP (ref 0–0.2)
BASOPHILS NFR BLD AUTO: 0.5 % — SIGNIFICANT CHANGE UP (ref 0–2)
BILIRUB SERPL-MCNC: 0.3 MG/DL — SIGNIFICANT CHANGE UP (ref 0.2–1.2)
BUN SERPL-MCNC: 12 MG/DL — SIGNIFICANT CHANGE UP (ref 7–23)
CALCIUM SERPL-MCNC: 8.9 MG/DL — SIGNIFICANT CHANGE UP (ref 8.4–10.5)
CHLORIDE SERPL-SCNC: 104 MMOL/L — SIGNIFICANT CHANGE UP (ref 98–107)
CO2 SERPL-SCNC: 21 MMOL/L — LOW (ref 22–31)
CREAT SERPL-MCNC: 0.98 MG/DL — SIGNIFICANT CHANGE UP (ref 0.5–1.3)
D DIMER BLD IA.RAPID-MCNC: 164 NG/ML DDU — SIGNIFICANT CHANGE UP
EOSINOPHIL # BLD AUTO: 0.35 K/UL — SIGNIFICANT CHANGE UP (ref 0–0.5)
EOSINOPHIL NFR BLD AUTO: 4 % — SIGNIFICANT CHANGE UP (ref 0–6)
GLUCOSE SERPL-MCNC: 91 MG/DL — SIGNIFICANT CHANGE UP (ref 70–99)
HCT VFR BLD CALC: 43.3 % — SIGNIFICANT CHANGE UP (ref 39–50)
HGB BLD-MCNC: 14.5 G/DL — SIGNIFICANT CHANGE UP (ref 13–17)
IANC: 4.76 K/UL — SIGNIFICANT CHANGE UP (ref 1.5–8.5)
IMM GRANULOCYTES NFR BLD AUTO: 0.3 % — SIGNIFICANT CHANGE UP (ref 0–1.5)
LYMPHOCYTES # BLD AUTO: 2.77 K/UL — SIGNIFICANT CHANGE UP (ref 1–3.3)
LYMPHOCYTES # BLD AUTO: 31.9 % — SIGNIFICANT CHANGE UP (ref 13–44)
MCHC RBC-ENTMCNC: 29.1 PG — SIGNIFICANT CHANGE UP (ref 27–34)
MCHC RBC-ENTMCNC: 33.5 GM/DL — SIGNIFICANT CHANGE UP (ref 32–36)
MCV RBC AUTO: 86.9 FL — SIGNIFICANT CHANGE UP (ref 80–100)
MONOCYTES # BLD AUTO: 0.73 K/UL — SIGNIFICANT CHANGE UP (ref 0–0.9)
MONOCYTES NFR BLD AUTO: 8.4 % — SIGNIFICANT CHANGE UP (ref 2–14)
NEUTROPHILS # BLD AUTO: 4.76 K/UL — SIGNIFICANT CHANGE UP (ref 1.8–7.4)
NEUTROPHILS NFR BLD AUTO: 54.9 % — SIGNIFICANT CHANGE UP (ref 43–77)
NRBC # BLD: 0 /100 WBCS — SIGNIFICANT CHANGE UP
NRBC # FLD: 0 K/UL — SIGNIFICANT CHANGE UP
PLATELET # BLD AUTO: 171 K/UL — SIGNIFICANT CHANGE UP (ref 150–400)
POTASSIUM SERPL-MCNC: 3.7 MMOL/L — SIGNIFICANT CHANGE UP (ref 3.5–5.3)
POTASSIUM SERPL-SCNC: 3.7 MMOL/L — SIGNIFICANT CHANGE UP (ref 3.5–5.3)
PROT SERPL-MCNC: 7.2 G/DL — SIGNIFICANT CHANGE UP (ref 6–8.3)
RBC # BLD: 4.98 M/UL — SIGNIFICANT CHANGE UP (ref 4.2–5.8)
RBC # FLD: 14 % — SIGNIFICANT CHANGE UP (ref 10.3–14.5)
SODIUM SERPL-SCNC: 138 MMOL/L — SIGNIFICANT CHANGE UP (ref 135–145)
TROPONIN T, HIGH SENSITIVITY RESULT: <6 NG/L — SIGNIFICANT CHANGE UP
WBC # BLD: 8.68 K/UL — SIGNIFICANT CHANGE UP (ref 3.8–10.5)
WBC # FLD AUTO: 8.68 K/UL — SIGNIFICANT CHANGE UP (ref 3.8–10.5)

## 2021-07-27 PROCEDURE — 93010 ELECTROCARDIOGRAM REPORT: CPT

## 2021-07-27 PROCEDURE — 99285 EMERGENCY DEPT VISIT HI MDM: CPT | Mod: 25

## 2021-07-27 PROCEDURE — 71046 X-RAY EXAM CHEST 2 VIEWS: CPT | Mod: 26

## 2021-07-27 RX ORDER — LIDOCAINE 4 G/100G
1 CREAM TOPICAL ONCE
Refills: 0 | Status: DISCONTINUED | OUTPATIENT
Start: 2021-07-27 | End: 2021-07-27

## 2021-07-27 RX ORDER — OXYCODONE AND ACETAMINOPHEN 5; 325 MG/1; MG/1
1 TABLET ORAL ONCE
Refills: 0 | Status: DISCONTINUED | OUTPATIENT
Start: 2021-07-27 | End: 2021-07-27

## 2021-07-27 RX ORDER — LIDOCAINE 4 G/100G
1 CREAM TOPICAL ONCE
Refills: 0 | Status: COMPLETED | OUTPATIENT
Start: 2021-07-27 | End: 2021-07-27

## 2021-07-27 RX ORDER — IBUPROFEN 200 MG
1 TABLET ORAL
Qty: 18 | Refills: 0
Start: 2021-07-27 | End: 2021-08-01

## 2021-07-27 RX ORDER — KETOROLAC TROMETHAMINE 30 MG/ML
15 SYRINGE (ML) INJECTION ONCE
Refills: 0 | Status: DISCONTINUED | OUTPATIENT
Start: 2021-07-27 | End: 2021-07-27

## 2021-07-27 RX ORDER — OXYCODONE AND ACETAMINOPHEN 5; 325 MG/1; MG/1
1 TABLET ORAL
Qty: 8 | Refills: 0
Start: 2021-07-27 | End: 2022-06-02

## 2021-07-27 RX ORDER — IBUPROFEN 200 MG
1 TABLET ORAL
Qty: 18 | Refills: 0
Start: 2021-07-27 | End: 2022-06-06

## 2021-07-27 RX ORDER — OXYCODONE AND ACETAMINOPHEN 5; 325 MG/1; MG/1
1 TABLET ORAL
Qty: 8 | Refills: 0
Start: 2021-07-27 | End: 2021-07-28

## 2021-07-27 RX ORDER — CYCLOBENZAPRINE HYDROCHLORIDE 10 MG/1
1 TABLET, FILM COATED ORAL
Qty: 21 | Refills: 0
Start: 2021-07-27 | End: 2021-08-02

## 2021-07-27 RX ADMIN — Medication 15 MILLIGRAM(S): at 19:09

## 2021-07-27 RX ADMIN — OXYCODONE AND ACETAMINOPHEN 1 TABLET(S): 5; 325 TABLET ORAL at 20:39

## 2021-07-27 RX ADMIN — LIDOCAINE 1 PATCH: 4 CREAM TOPICAL at 19:09

## 2021-07-27 NOTE — ED ADULT NURSE NOTE - DISCHARGE DATE/TIME
Patient was in to see Dr Casey on 12/15/17 and was told that a prescription for some pills for weight loss was [facility] to [facility].    [facility] states that [facility] told her they have never received any script from Dr Casey for her, and they said that she needs to have him get in touch with the pharmacy to straighten this out.    Patient would like a callback for an update, and can be reached at [phone] (M).   27-Jul-2021 21:35

## 2021-07-27 NOTE — ED PROVIDER NOTE - CLINICAL SUMMARY MEDICAL DECISION MAKING FREE TEXT BOX
42 y/o M PMH HTN p/w right sided flank pain. Pt reports that pain started 2 days ago. pain is sharp aching located in the right sided mid axillary line worse with movement and palpation.  Pain is worse with deep breathing and comes in bursts . Pt reports daily smoking, smoked marijuana and tobacco. pain is 2/10 then increases to 8/10 w/ sharp lancing pain in his side mid axillary line. pt w/ chest pain concerning for pleurisy vs ptx, vs msk vs pneumona vs less likel PE . will check cbc, cmp, ekg, trop, cxr, d dimer, pain control w/ ketorolac muscle relaxer . reassess low suspicion of acs

## 2021-07-27 NOTE — ED PROVIDER NOTE - NSFOLLOWUPINSTRUCTIONS_ED_ALL_ED_FT
During your ED visit you were evaluated for right sided rib pain. You had blood work and xrays and were provided with the results. Take motrin 600mg every 8 hours as needed for pain. Take cyclobenazprine 10mg every 8 hours as needed for muscle spasm. Take perocecet 5/325mg every 6 hours as needed fro severe pain. Do not drink alcohol, drive or operate motorized vehicles while taking this medication.   follow up with pulmonology , a list has been provided to you. Return to the ED if you exhibit any new, continued or worsening symptoms.

## 2021-07-27 NOTE — ED ADULT TRIAGE NOTE - CHIEF COMPLAINT QUOTE
c/o right flank pain since Sunday morning. worse on inspiration and movement. denies other complaints. denies medical hx. appears comfortable.

## 2021-07-27 NOTE — ED PROVIDER NOTE - PHYSICAL EXAMINATION
General: WDWN male, alert, sitting comfortably in NAD, pleasant and cooperative  Chest: point tenderness to palpation at around rib 5 in mid-axillary line, no overlying ecchymosis or abrasion, no other chest wall tenderness  Cardio: RRR, no MRG, positive S1/S2  Pulm: CTAB  Abdomen: soft, non-tender, non-distended  Back: no CVA tenderness  Extremities: no calf tenderness or pedal edema

## 2021-07-27 NOTE — ED ADULT NURSE NOTE - OBJECTIVE STATEMENT
42 y/o c/o right rib pain over the past 2 days. Patient denies any recent travel, falls, injuries, or exertion and states he has never experienced pain like this in the past. He also denies fever, chills, cough, shortness of breath, chest pain, palpitations, abdominal pain, testicular pain, and urinary symptoms. Patient notes that he was treated for a left sided pneumonia last month. PIV placed, labs sent, VS as documented, will continue to monitor.

## 2021-07-27 NOTE — ED PROVIDER NOTE - ATTENDING CONTRIBUTION TO CARE
42 y/o M PMH HTN p/w right sided flank pain. Pt reports that pain started 2 days ago. pain is sharp aching located in the right sided mid axillary line worse with movement and palpation. He denies fever chills, nausea, vomiting, SOB. Pt denies chest pain, falls, trauma to the area. he denies LE swelling, recent travel. He took aleeve for the pain w/ mild improvement. Pain is worse with deep breathing and comes in bursts . Pt reports daily smoking, smoked marijuana and tobacco. pain is 2/10 then increases to 8/10 w/ sharp lancing pain in his side mid axillary line  denies fever, chills, +chest pain, SOB, abdominal pain, diarrhea, dysuria, syncope, bleeding, new rash,weakness, numbness, blurred vision    ROS  otherwise negative as per HPI  Gen: Awake, Alert, WD, WN, NAD  Head:  NC/AT  Eyes:  PERRL, EOMI, Conjunctiva pink, lids normal, no scleral icterus  ENT: OP clear, no exudates, no erythema, uvula midline, TMs clear bilaterally, moist mucus membranes  Neck: supple, nontender, no meningismus, no JVD, trachea midline  Cardiac/CV:  S1 S2, RRR, no M/G/R  Respiratory/Pulm:  CTAB, good air movement, normal resp effort, no wheezes/stridor/retractions/rales/rhonchi. pain on palpation of right side of chest   Gastrointestinal/Abdomen:  Soft, obese, nontender, nondistended, +BS, no rebound/guarding  Back:  no CVAT, no MLT  Ext:  warm, well perfused, moving all extremities spontaneously, no peripheral edema, distal pulses intact  Skin: intact, no rash  Neuro:  AAOx3, sensation intact, motor 5/5 x 4 extremities, normal gait, speech clear  MDM as above

## 2021-07-27 NOTE — ED PROVIDER NOTE - PATIENT PORTAL LINK FT
You can access the FollowMyHealth Patient Portal offered by [facility] by registering at the following website: [email or web address]. By joining [employer]’s FollowMyHealth portal, you will also be able to view your health information using other applications (apps) compatible with our system.

## 2021-07-27 NOTE — ED PROVIDER NOTE - OBJECTIVE STATEMENT
42 y/o M with PMHx HTN presents for right rib pain over the past 2 days. Patient states he woke up with a sharp, aching pain in the mid-axillary region of his right side. His pain does not radiate, worsens with inspiration and prolonged rest, and improves with movement. He has been taking Aleve with some improvement, most recently ~11:00 today. Patient denies any recent travel, falls, injuries, or exertion and states he has never experienced pain like this in the past. He also denies fever, chills, cough, shortness of breath, chest pain, palpitations, abdominal pain, testicular pain, and urinary symptoms. Patient notes that he was treated for a left sided pneumonia last month. He is a current daily smoker (cigarettes and marijuana).

## 2021-07-27 NOTE — ED PROVIDER NOTE - CARE PLAN
Principal Discharge DX:	Chest pain   Principal Discharge DX:	Rib pain on right side  Assessment and plan of treatment:	During your ED visit you were evaluated for right sided rib pain. You had blood work and xrays and were provided with the results. Take motrin 600mg every 8 hours as needed for pain. Take cyclobenazprine 10mg every 8 hours as needed for muscle spasm. Take perocecet 5/325mg every 6 hours as needed fro severe pain. Do not drink alcohol, drive or operate motorized vehicles while taking this medication.   follow up with pulmonology , a list has been provided to you. Return to the ED if you exhibit any new, continued or worsening symptoms.

## 2021-07-27 NOTE — ED PROVIDER NOTE - NSFOLLOWUPCLINICS_GEN_ALL_ED_FT
[facility] Pulmonolgy and Sleep Medicine  Pulmonology  [address] [place] [address], [facility] [address]  [place] [address]  Phone: [phone]  Fax:   Follow Up Time: 7-10 Days

## 2021-07-27 NOTE — ED PROVIDER NOTE - PROGRESS NOTE DETAILS
istop #: [id number] Pain improved, pt states pain is improved with mediations, trop negative, negative dimer. Pt offered ct scan states he will follow up recommended follow up with pulm, pulm list and return precautions provided.

## 2021-07-28 PROBLEM — Z86.79 PERSONAL HISTORY OF OTHER DISEASES OF THE CIRCULATORY SYSTEM: Chronic | Status: ACTIVE | Noted: 2021-06-17

## 2021-10-08 ENCOUNTER — EMERGENCY (EMERGENCY)
Facility: HOSPITAL | Age: 41
LOS: 1 days | Discharge: HOME | End: 2021-10-08
Attending: EMERGENCY MEDICINE | Admitting: EMERGENCY MEDICINE
Payer: MEDICAID

## 2021-10-08 VITALS
HEIGHT: 72 IN | OXYGEN SATURATION: 99 % | DIASTOLIC BLOOD PRESSURE: 98 MMHG | SYSTOLIC BLOOD PRESSURE: 147 MMHG | TEMPERATURE: 98 F | RESPIRATION RATE: 14 BRPM | HEART RATE: 87 BPM

## 2021-10-08 LAB
ALBUMIN SERPL ELPH-MCNC: 4.4 G/DL — SIGNIFICANT CHANGE UP (ref 3.3–5)
ALP SERPL-CCNC: 89 U/L — SIGNIFICANT CHANGE UP (ref 40–120)
ALT FLD-CCNC: 39 U/L — SIGNIFICANT CHANGE UP (ref 4–41)
ANION GAP SERPL CALC-SCNC: 14 MMOL/L — SIGNIFICANT CHANGE UP (ref 7–14)
APPEARANCE UR: CLEAR — SIGNIFICANT CHANGE UP
AST SERPL-CCNC: 40 U/L — SIGNIFICANT CHANGE UP (ref 4–40)
B PERT DNA SPEC QL NAA+PROBE: SIGNIFICANT CHANGE UP
B PERT+PARAPERT DNA PNL SPEC NAA+PROBE: SIGNIFICANT CHANGE UP
BACTERIA # UR AUTO: NEGATIVE — SIGNIFICANT CHANGE UP
BASOPHILS # BLD AUTO: 0.06 K/UL — SIGNIFICANT CHANGE UP (ref 0–0.2)
BASOPHILS NFR BLD AUTO: 1.2 % — SIGNIFICANT CHANGE UP (ref 0–2)
BILIRUB SERPL-MCNC: 0.3 MG/DL — SIGNIFICANT CHANGE UP (ref 0.2–1.2)
BILIRUB UR-MCNC: NEGATIVE — SIGNIFICANT CHANGE UP
BORDETELLA PARAPERTUSSIS (RAPRVP): SIGNIFICANT CHANGE UP
BUN SERPL-MCNC: 13 MG/DL — SIGNIFICANT CHANGE UP (ref 7–23)
C PNEUM DNA SPEC QL NAA+PROBE: SIGNIFICANT CHANGE UP
CALCIUM SERPL-MCNC: 9.7 MG/DL — SIGNIFICANT CHANGE UP (ref 8.4–10.5)
CHLORIDE SERPL-SCNC: 102 MMOL/L — SIGNIFICANT CHANGE UP (ref 98–107)
CO2 SERPL-SCNC: 25 MMOL/L — SIGNIFICANT CHANGE UP (ref 22–31)
COLOR SPEC: YELLOW — SIGNIFICANT CHANGE UP
CREAT SERPL-MCNC: 1.22 MG/DL — SIGNIFICANT CHANGE UP (ref 0.5–1.3)
D DIMER BLD IA.RAPID-MCNC: 172 NG/ML DDU — SIGNIFICANT CHANGE UP
DIFF PNL FLD: NEGATIVE — SIGNIFICANT CHANGE UP
EOSINOPHIL # BLD AUTO: 0.23 K/UL — SIGNIFICANT CHANGE UP (ref 0–0.5)
EOSINOPHIL NFR BLD AUTO: 4.7 % — SIGNIFICANT CHANGE UP (ref 0–6)
EPI CELLS # UR: 1 /HPF — SIGNIFICANT CHANGE UP (ref 0–5)
FLUAV SUBTYP SPEC NAA+PROBE: SIGNIFICANT CHANGE UP
FLUBV RNA SPEC QL NAA+PROBE: SIGNIFICANT CHANGE UP
GLUCOSE SERPL-MCNC: 100 MG/DL — HIGH (ref 70–99)
GLUCOSE UR QL: NEGATIVE — SIGNIFICANT CHANGE UP
HADV DNA SPEC QL NAA+PROBE: SIGNIFICANT CHANGE UP
HCOV 229E RNA SPEC QL NAA+PROBE: SIGNIFICANT CHANGE UP
HCOV HKU1 RNA SPEC QL NAA+PROBE: SIGNIFICANT CHANGE UP
HCOV NL63 RNA SPEC QL NAA+PROBE: SIGNIFICANT CHANGE UP
HCOV OC43 RNA SPEC QL NAA+PROBE: SIGNIFICANT CHANGE UP
HCT VFR BLD CALC: 48.5 % — SIGNIFICANT CHANGE UP (ref 39–50)
HGB BLD-MCNC: 16.3 G/DL — SIGNIFICANT CHANGE UP (ref 13–17)
HMPV RNA SPEC QL NAA+PROBE: SIGNIFICANT CHANGE UP
HPIV1 RNA SPEC QL NAA+PROBE: SIGNIFICANT CHANGE UP
HPIV2 RNA SPEC QL NAA+PROBE: SIGNIFICANT CHANGE UP
HPIV3 RNA SPEC QL NAA+PROBE: SIGNIFICANT CHANGE UP
HPIV4 RNA SPEC QL NAA+PROBE: SIGNIFICANT CHANGE UP
HYALINE CASTS # UR AUTO: 1 /LPF — SIGNIFICANT CHANGE UP (ref 0–7)
IANC: 1.88 K/UL — SIGNIFICANT CHANGE UP (ref 1.5–8.5)
IMM GRANULOCYTES NFR BLD AUTO: 0.2 % — SIGNIFICANT CHANGE UP (ref 0–1.5)
KETONES UR-MCNC: ABNORMAL
LEUKOCYTE ESTERASE UR-ACNC: NEGATIVE — SIGNIFICANT CHANGE UP
LYMPHOCYTES # BLD AUTO: 1.95 K/UL — SIGNIFICANT CHANGE UP (ref 1–3.3)
LYMPHOCYTES # BLD AUTO: 39.6 % — SIGNIFICANT CHANGE UP (ref 13–44)
M PNEUMO DNA SPEC QL NAA+PROBE: SIGNIFICANT CHANGE UP
MCHC RBC-ENTMCNC: 29.3 PG — SIGNIFICANT CHANGE UP (ref 27–34)
MCHC RBC-ENTMCNC: 33.6 GM/DL — SIGNIFICANT CHANGE UP (ref 32–36)
MCV RBC AUTO: 87.1 FL — SIGNIFICANT CHANGE UP (ref 80–100)
MONOCYTES # BLD AUTO: 0.8 K/UL — SIGNIFICANT CHANGE UP (ref 0–0.9)
MONOCYTES NFR BLD AUTO: 16.2 % — HIGH (ref 2–14)
NEUTROPHILS # BLD AUTO: 1.88 K/UL — SIGNIFICANT CHANGE UP (ref 1.8–7.4)
NEUTROPHILS NFR BLD AUTO: 38.1 % — LOW (ref 43–77)
NITRITE UR-MCNC: NEGATIVE — SIGNIFICANT CHANGE UP
NRBC # BLD: 0 /100 WBCS — SIGNIFICANT CHANGE UP
NRBC # FLD: 0 K/UL — SIGNIFICANT CHANGE UP
PH UR: 6 — SIGNIFICANT CHANGE UP (ref 5–8)
PLATELET # BLD AUTO: 172 K/UL — SIGNIFICANT CHANGE UP (ref 150–400)
POTASSIUM SERPL-MCNC: 3.8 MMOL/L — SIGNIFICANT CHANGE UP (ref 3.5–5.3)
POTASSIUM SERPL-SCNC: 3.8 MMOL/L — SIGNIFICANT CHANGE UP (ref 3.5–5.3)
PROT SERPL-MCNC: 8.2 G/DL — SIGNIFICANT CHANGE UP (ref 6–8.3)
PROT UR-MCNC: ABNORMAL
RAPID RVP RESULT: DETECTED
RBC # BLD: 5.57 M/UL — SIGNIFICANT CHANGE UP (ref 4.2–5.8)
RBC # FLD: 14 % — SIGNIFICANT CHANGE UP (ref 10.3–14.5)
RBC CASTS # UR COMP ASSIST: 3 /HPF — SIGNIFICANT CHANGE UP (ref 0–4)
RSV RNA SPEC QL NAA+PROBE: SIGNIFICANT CHANGE UP
RV+EV RNA SPEC QL NAA+PROBE: SIGNIFICANT CHANGE UP
SARS-COV-2 RNA SPEC QL NAA+PROBE: DETECTED
SODIUM SERPL-SCNC: 141 MMOL/L — SIGNIFICANT CHANGE UP (ref 135–145)
SP GR SPEC: 1.04 — SIGNIFICANT CHANGE UP (ref 1–1.05)
UROBILINOGEN FLD QL: ABNORMAL
WBC # BLD: 4.93 K/UL — SIGNIFICANT CHANGE UP (ref 3.8–10.5)
WBC # FLD AUTO: 4.93 K/UL — SIGNIFICANT CHANGE UP (ref 3.8–10.5)
WBC UR QL: 1 /HPF — SIGNIFICANT CHANGE UP (ref 0–5)

## 2021-10-08 PROCEDURE — 71045 X-RAY EXAM CHEST 1 VIEW: CPT | Mod: 26

## 2021-10-08 PROCEDURE — 71275 CT ANGIOGRAPHY CHEST: CPT | Mod: 26

## 2021-10-08 PROCEDURE — 99285 EMERGENCY DEPT VISIT HI MDM: CPT

## 2021-10-08 RX ORDER — SODIUM CHLORIDE 9 MG/ML
1000 INJECTION INTRAMUSCULAR; INTRAVENOUS; SUBCUTANEOUS ONCE
Refills: 0 | Status: COMPLETED | OUTPATIENT
Start: 2021-10-08 | End: 2021-10-08

## 2021-10-08 RX ORDER — ACETAMINOPHEN 500 MG
650 TABLET ORAL ONCE
Refills: 0 | Status: COMPLETED | OUTPATIENT
Start: 2021-10-08 | End: 2021-10-08

## 2021-10-08 RX ADMIN — SODIUM CHLORIDE 1000 MILLILITER(S): 9 INJECTION INTRAMUSCULAR; INTRAVENOUS; SUBCUTANEOUS at 15:48

## 2021-10-08 RX ADMIN — Medication 650 MILLIGRAM(S): at 15:49

## 2021-10-08 NOTE — ED PROVIDER NOTE - ATTENDING CONTRIBUTION TO CARE
pt w/ likely covid, normal ambulatory sat, well appearing, right pleuritic CP, will CTA r/o clot, reassess.   GEN - NAD; well appearing; A+O x3   HEAD - NC/AT     EYES - EOMI, no conjunctival pallor, no scleral icterus  ENT -   mucous membranes  moist , no discharge      NECK - Neck supple  PULM - CTA b/l,  symmetric breath sounds  COR -  RRR, S1 S2, no murmurs  ABD - , ND, NT, soft, no guarding, no rebound, no masses    BACK - no CVA tenderness, nontender spine     EXTREMS - no edema, no deformity, warm and well perfused    SKIN - no rash or bruising      NEUROLOGIC - alert, sensation nl, motor 5/5 RUE/LUE/RLE/LLE

## 2021-10-08 NOTE — ED PROVIDER NOTE - OBJECTIVE STATEMENT
41M no sig PMH CC back pain constant in nature, described as pins and needles over his entire back pain in constant in nature. Loss of taste and smell today, muscle pain, diffuse over the past two days.  Had similar episode 2 months ago, presented to ED -tested negative for covid, at that time, is not vaccinated. PT notes gurgling feeling over right side of lower back when ever he takes a deep breath, pt states he feels that something is wrong inside his chest.  Took 1.2Grams of Motrin today

## 2021-10-08 NOTE — ED PROVIDER NOTE - NSFOLLOWUPINSTRUCTIONS_ED_ALL_ED_FT
May return to work when patient is afebrile for 72 hours with no tylenol or motrin or fever reducing medications.      Recommendations for Patients Advised to Self-Isolate for Possible COVID-19 Exposure  We recommend the below precautionary steps from now until 14 days from when you returned from your travel or date of your last known possible contact:  ? Do not go to work, school, or public areas. Avoid using public transportation, ride-sharing, or taxis.  ? As much as possible, separate yourself from other people in your home. If you can, you should stay in a room and away from other people in your home. Also, you should use a separate bathroom, if available.  ? Wear the supplied mask whenever you are around other people.  ? If you have a non-urgent medical appointment, please reschedule for a later date. If the appointment is urgent, please call the healthcare provider and tell them that you are on selfisolation for possible COVID-19. This will help the healthcare provider’s office take steps to keep other people from getting infected or exposed. If you can reschedule routine appointments, do so.  ? Wash your hands often with soap and water for at least 15 to 20 seconds or clean your hands with an alcohol-based hand  that contains 60 to 95% alcohol, covering all surfaces of your hands and rubbing them together until they feel dry. Soap and water should be used preferentially if hands are visibly dirty.  ? Cover your mouth and nose with a tissue when you cough or sneeze. Throw used tissues in a lined trash can; immediately wash your hands.  ? Avoid touching your eyes, nose, and mouth with your hands.  ? Avoid sharing personal household items. You should not share dishes, drinking glasses, cups, eating utensils, towels, or bedding with other people or pets in your home. After using these items, they should be washed thoroughly with soap and water.  ? Clean and disinfect all “high-touch” surfaces every day. High touch surfaces include counters, tabletops, doorknobs, light switches, remote controls, bathroom fixtures, toilets, phones, keyboards, tablets, and bedside tables. Also, clean any surfaces that may have blood, stool, or body fluids on them Today you were evaluated in the ED for your symptoms, it was found that you were positive for COVID. Please alert others that you have been in contact with that they should test for covid as well.     May return to work when patient is afebrile for 72 hours with no tylenol or motrin or fever reducing medications.      Recommendations for Patients Advised to Self-Isolate for Possible COVID-19 Exposure  We recommend the below precautionary steps from now until 14 days from when you returned from your travel or date of your last known possible contact:  ? Do not go to work, school, or public areas. Avoid using public transportation, ride-sharing, or taxis.  ? As much as possible, separate yourself from other people in your home. If you can, you should stay in a room and away from other people in your home. Also, you should use a separate bathroom, if available.  ? Wear the supplied mask whenever you are around other people.  ? If you have a non-urgent medical appointment, please reschedule for a later date. If the appointment is urgent, please call the healthcare provider and tell them that you are on selfisolation for possible COVID-19. This will help the healthcare provider’s office take steps to keep other people from getting infected or exposed. If you can reschedule routine appointments, do so.  ? Wash your hands often with soap and water for at least 15 to 20 seconds or clean your hands with an alcohol-based hand  that contains 60 to 95% alcohol, covering all surfaces of your hands and rubbing them together until they feel dry. Soap and water should be used preferentially if hands are visibly dirty.  ? Cover your mouth and nose with a tissue when you cough or sneeze. Throw used tissues in a lined trash can; immediately wash your hands.  ? Avoid touching your eyes, nose, and mouth with your hands.  ? Avoid sharing personal household items. You should not share dishes, drinking glasses, cups, eating utensils, towels, or bedding with other people or pets in your home. After using these items, they should be washed thoroughly with soap and water.  ? Clean and disinfect all “high-touch” surfaces every day. High touch surfaces include counters, tabletops, doorknobs, light switches, remote controls, bathroom fixtures, toilets, phones, keyboards, tablets, and bedside tables. Also, clean any surfaces that may have blood, stool, or body fluids on them
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
## 2021-10-08 NOTE — ED PROVIDER NOTE - PATIENT PORTAL LINK FT
You can access the FollowMyHealth Patient Portal offered by [facility] by registering at the following website: [email or web address]. By joining [employer]’s FollowMyHealth portal, you will also be able to view your health information using other applications (apps) compatible with our system.

## 2021-10-08 NOTE — ED PROVIDER NOTE - PHYSICAL EXAMINATION
T(C): 36.7 (08 Oct 2021 13:52), Max: 36.7 (08 Oct 2021 13:52)  T(F): 98.1 (08 Oct 2021 13:52), Max: 98.1 (08 Oct 2021 13:52)  HR: 87 (08 Oct 2021 13:52) (87 - 87)  BP: 147/98 (08 Oct 2021 13:52) (147/98 - 147/98)  BP(mean): --  ABP: --  ABP(mean): --  RR: 14 (08 Oct 2021 13:52) (14 - 14)  SpO2: 99% (08 Oct 2021 13:52) (99% - 99%)  Ambulation sat 99%  GENERAL: Awake, alert, NAD  HEENT: NC/AT, moist mucous membranes, no pharyngeal exudate, no cervical lymphadenopathy   LUNGS: CTAB, no wheezes or crackles   CARDIAC: RRR, no m/r/g  ABDOMEN: Soft, non tender, non distended, no rebound, no guarding  BACK: No midline spinal tenderness, no paraspinal tenderness   EXT: No edema, no calf tenderness, 2+ DP pulses bilaterally, no deformities. straight leg raise negative b/l   NEURO: A&Ox3. Moving all extremities.  SKIN: Warm and dry. No rash.  PSYCH: Normal affect.

## 2021-10-08 NOTE — ED ADULT TRIAGE NOTE - CHIEF COMPLAINT QUOTE
pt ambulatory to walk in triage c/o body aches, weakness, subjective fevers, diaphoresis, DOWNS/ deep breathing.  PMH: HTN, pack a day smoker
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
## 2021-10-09 LAB
CULTURE RESULTS: NO GROWTH — SIGNIFICANT CHANGE UP
SPECIMEN SOURCE: SIGNIFICANT CHANGE UP

## 2022-02-25 NOTE — ED ADULT NURSE NOTE - RECENT EXPOSURE TO
Osteoporosis  Take calcium, vit D daily, exercise.  Suggest trial of boniva monthly. Will send rx if agrees. 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

## 2022-05-31 NOTE — ED ADULT NURSE NOTE - CAS TRG GEN SKIN CONDITION
PROCEDURE NOTE: Avastin 1.25mg OD. Diagnosis: Central Retinal Vein Occlusion with Macular Edema. Anesthesia: Topical. Prep: Betadine Drops. Prior to injection, risks/benefits/alternatives discussed including infection, loss of vision, hemorrhage, cataract, glaucoma, retinal tears or detachment. The off-label status of Intravitreal Avastin also was reviewed. The patient wished to proceed with treatment. Topical anesthesia was induced with Alcaine. Additional anesthesia was achieved using drop(s) or injection checked above. A drop of Povidone-iodine 5% ophthalmic solution was instilled over the injection site and in the inferior fornix. Betadine prep was performed. Using the syringe provided, Avastin 1.25 mg in 0.05 cc was injected into the vitreous cavity. The needle was passed 3.0 mm posterior to the limbus in pseudophakic patients, and 3.5 mm posterior to the limbus in phakic patients. Patient tolerated procedure well. There were no complications. Injection time: *. Lot #: [id number]. Expiration Date: 8/26/2022. Post-op instructions given. Inventory Id: null. The patient was instructed to return for re-evaluation in approximately 4-12 weeks depending on his/her condition and was told to call immediately if vision decreases and/or if his/her eye becomes red, painful, and/or light sensitive. The patient was instructed to go to the emergency room or call 911 if unable to reach the doctor within an hour or two of trying or calling. The patient was instructed to use Artificial Tears q.i.d. p.r.n for comfort. Maribel Leung
Dry/Warm

## 2022-06-01 ENCOUNTER — EMERGENCY (EMERGENCY)
Facility: HOSPITAL | Age: 42
LOS: 1 days | Discharge: LEFT BEFORE DISCHARGE | End: 2022-06-01
Attending: EMERGENCY MEDICINE | Admitting: EMERGENCY MEDICINE
Payer: MEDICAID

## 2022-06-01 VITALS
DIASTOLIC BLOOD PRESSURE: 103 MMHG | RESPIRATION RATE: 16 BRPM | HEIGHT: 72 IN | SYSTOLIC BLOOD PRESSURE: 169 MMHG | TEMPERATURE: 98 F | HEART RATE: 130 BPM | OXYGEN SATURATION: 100 %

## 2022-06-01 VITALS
OXYGEN SATURATION: 100 % | SYSTOLIC BLOOD PRESSURE: 189 MMHG | RESPIRATION RATE: 16 BRPM | DIASTOLIC BLOOD PRESSURE: 110 MMHG | HEART RATE: 117 BPM

## 2022-06-01 PROCEDURE — 99284 EMERGENCY DEPT VISIT MOD MDM: CPT

## 2022-06-01 PROCEDURE — 71101 X-RAY EXAM UNILAT RIBS/CHEST: CPT | Mod: 26,LT

## 2022-06-01 RX ORDER — ACETAMINOPHEN 500 MG
650 TABLET ORAL ONCE
Refills: 0 | Status: COMPLETED | OUTPATIENT
Start: 2022-06-01 | End: 2022-06-01

## 2022-06-01 RX ORDER — LIDOCAINE 4 G/100G
1 CREAM TOPICAL ONCE
Refills: 0 | Status: COMPLETED | OUTPATIENT
Start: 2022-06-01 | End: 2022-06-01

## 2022-06-01 RX ORDER — IBUPROFEN 200 MG
600 TABLET ORAL ONCE
Refills: 0 | Status: COMPLETED | OUTPATIENT
Start: 2022-06-01 | End: 2022-06-01

## 2022-06-01 RX ADMIN — LIDOCAINE 1 PATCH: 4 CREAM TOPICAL at 18:44

## 2022-06-01 RX ADMIN — Medication 600 MILLIGRAM(S): at 18:45

## 2022-06-01 RX ADMIN — Medication 650 MILLIGRAM(S): at 18:44
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
## 2022-06-01 NOTE — ED PROVIDER NOTE - SKIN WOUND DESCRIPTION
+4 superficial abrasions on left chest, no bleeding, +superficial linear abrasion on left arm, +hematoma on left frontal region of head
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
## 2022-06-01 NOTE — ED PROVIDER NOTE - HISTORY ATTESTATION, MLM
I have reviewed and confirmed nurses' notes... V-Y Plasty Text: The defect edges were debeveled with a #15 scalpel blade.  Given the location of the defect, shape of the defect and the proximity to free margins an V-Y advancement flap was deemed most appropriate.  Using a sterile surgical marker, an appropriate advancement flap was drawn incorporating the defect and placing the expected incisions within the relaxed skin tension lines where possible.    The area thus outlined was incised deep to adipose tissue with a #15 scalpel blade.  The skin margins were undermined to an appropriate distance in all directions utilizing iris scissors.
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
## 2023-08-31 NOTE — ED ADULT NURSE NOTE - HOW OFTEN DO YOU HAVE A DRINK CONTAINING ALCOHOL?
no abrasions, no jaundice, no lesions, no pruritis, and no rashes. complains of pain/discomfort Two to four times a month

## 2023-10-04 ENCOUNTER — EMERGENCY (EMERGENCY)
Facility: HOSPITAL | Age: 43
LOS: 1 days | Discharge: HOME | End: 2023-10-04
Attending: EMERGENCY MEDICINE | Admitting: EMERGENCY MEDICINE
Payer: MEDICAID

## 2023-10-04 VITALS
TEMPERATURE: 98 F | RESPIRATION RATE: 16 BRPM | SYSTOLIC BLOOD PRESSURE: 152 MMHG | OXYGEN SATURATION: 96 % | DIASTOLIC BLOOD PRESSURE: 84 MMHG | HEART RATE: 106 BPM

## 2023-10-04 PROBLEM — S22.39XA FRACTURE OF ONE RIB, UNSPECIFIED SIDE, INITIAL ENCOUNTER FOR CLOSED FRACTURE: Chronic | Status: ACTIVE | Noted: 2022-06-10

## 2023-10-04 LAB
ALBUMIN SERPL ELPH-MCNC: 4.3 G/DL — SIGNIFICANT CHANGE UP (ref 3.3–5)
ALP SERPL-CCNC: 73 U/L — SIGNIFICANT CHANGE UP (ref 40–120)
ALT FLD-CCNC: 30 U/L — SIGNIFICANT CHANGE UP (ref 4–41)
ANION GAP SERPL CALC-SCNC: 12 MMOL/L — SIGNIFICANT CHANGE UP (ref 7–14)
AST SERPL-CCNC: 34 U/L — SIGNIFICANT CHANGE UP (ref 4–40)
B PERT DNA SPEC QL NAA+PROBE: SIGNIFICANT CHANGE UP
B PERT+PARAPERT DNA PNL SPEC NAA+PROBE: SIGNIFICANT CHANGE UP
BASOPHILS # BLD AUTO: 0.04 K/UL — SIGNIFICANT CHANGE UP (ref 0–0.2)
BASOPHILS NFR BLD AUTO: 0.5 % — SIGNIFICANT CHANGE UP (ref 0–2)
BILIRUB SERPL-MCNC: 0.4 MG/DL — SIGNIFICANT CHANGE UP (ref 0.2–1.2)
BORDETELLA PARAPERTUSSIS (RAPRVP): SIGNIFICANT CHANGE UP
BUN SERPL-MCNC: 17 MG/DL — SIGNIFICANT CHANGE UP (ref 7–23)
C PNEUM DNA SPEC QL NAA+PROBE: SIGNIFICANT CHANGE UP
CALCIUM SERPL-MCNC: 8.9 MG/DL — SIGNIFICANT CHANGE UP (ref 8.4–10.5)
CHLORIDE SERPL-SCNC: 100 MMOL/L — SIGNIFICANT CHANGE UP (ref 98–107)
CO2 SERPL-SCNC: 27 MMOL/L — SIGNIFICANT CHANGE UP (ref 22–31)
CREAT SERPL-MCNC: 1.07 MG/DL — SIGNIFICANT CHANGE UP (ref 0.5–1.3)
EGFR: 89 ML/MIN/1.73M2 — SIGNIFICANT CHANGE UP
EOSINOPHIL # BLD AUTO: 0.33 K/UL — SIGNIFICANT CHANGE UP (ref 0–0.5)
EOSINOPHIL NFR BLD AUTO: 4.3 % — SIGNIFICANT CHANGE UP (ref 0–6)
FLUAV SUBTYP SPEC NAA+PROBE: SIGNIFICANT CHANGE UP
FLUBV RNA SPEC QL NAA+PROBE: SIGNIFICANT CHANGE UP
GLUCOSE SERPL-MCNC: 101 MG/DL — HIGH (ref 70–99)
HADV DNA SPEC QL NAA+PROBE: SIGNIFICANT CHANGE UP
HCOV 229E RNA SPEC QL NAA+PROBE: SIGNIFICANT CHANGE UP
HCOV HKU1 RNA SPEC QL NAA+PROBE: SIGNIFICANT CHANGE UP
HCOV NL63 RNA SPEC QL NAA+PROBE: SIGNIFICANT CHANGE UP
HCOV OC43 RNA SPEC QL NAA+PROBE: SIGNIFICANT CHANGE UP
HCT VFR BLD CALC: 44.9 % — SIGNIFICANT CHANGE UP (ref 39–50)
HGB BLD-MCNC: 15.2 G/DL — SIGNIFICANT CHANGE UP (ref 13–17)
HMPV RNA SPEC QL NAA+PROBE: SIGNIFICANT CHANGE UP
HPIV1 RNA SPEC QL NAA+PROBE: SIGNIFICANT CHANGE UP
HPIV2 RNA SPEC QL NAA+PROBE: SIGNIFICANT CHANGE UP
HPIV3 RNA SPEC QL NAA+PROBE: SIGNIFICANT CHANGE UP
HPIV4 RNA SPEC QL NAA+PROBE: SIGNIFICANT CHANGE UP
IANC: 3.8 K/UL — SIGNIFICANT CHANGE UP (ref 1.8–7.4)
IMM GRANULOCYTES NFR BLD AUTO: 0.1 % — SIGNIFICANT CHANGE UP (ref 0–0.9)
LYMPHOCYTES # BLD AUTO: 2.89 K/UL — SIGNIFICANT CHANGE UP (ref 1–3.3)
LYMPHOCYTES # BLD AUTO: 37.3 % — SIGNIFICANT CHANGE UP (ref 13–44)
M PNEUMO DNA SPEC QL NAA+PROBE: SIGNIFICANT CHANGE UP
MCHC RBC-ENTMCNC: 29.3 PG — SIGNIFICANT CHANGE UP (ref 27–34)
MCHC RBC-ENTMCNC: 33.9 GM/DL — SIGNIFICANT CHANGE UP (ref 32–36)
MCV RBC AUTO: 86.5 FL — SIGNIFICANT CHANGE UP (ref 80–100)
MONOCYTES # BLD AUTO: 0.67 K/UL — SIGNIFICANT CHANGE UP (ref 0–0.9)
MONOCYTES NFR BLD AUTO: 8.7 % — SIGNIFICANT CHANGE UP (ref 2–14)
NEUTROPHILS # BLD AUTO: 3.8 K/UL — SIGNIFICANT CHANGE UP (ref 1.8–7.4)
NEUTROPHILS NFR BLD AUTO: 49.1 % — SIGNIFICANT CHANGE UP (ref 43–77)
NRBC # BLD: 0 /100 WBCS — SIGNIFICANT CHANGE UP (ref 0–0)
NRBC # FLD: 0 K/UL — SIGNIFICANT CHANGE UP (ref 0–0)
PLATELET # BLD AUTO: 171 K/UL — SIGNIFICANT CHANGE UP (ref 150–400)
POTASSIUM SERPL-MCNC: 3.4 MMOL/L — LOW (ref 3.5–5.3)
POTASSIUM SERPL-SCNC: 3.4 MMOL/L — LOW (ref 3.5–5.3)
PROT SERPL-MCNC: 8.1 G/DL — SIGNIFICANT CHANGE UP (ref 6–8.3)
RAPID RVP RESULT: SIGNIFICANT CHANGE UP
RBC # BLD: 5.19 M/UL — SIGNIFICANT CHANGE UP (ref 4.2–5.8)
RBC # FLD: 13.7 % — SIGNIFICANT CHANGE UP (ref 10.3–14.5)
RSV RNA SPEC QL NAA+PROBE: SIGNIFICANT CHANGE UP
RV+EV RNA SPEC QL NAA+PROBE: SIGNIFICANT CHANGE UP
SARS-COV-2 RNA SPEC QL NAA+PROBE: SIGNIFICANT CHANGE UP
SODIUM SERPL-SCNC: 139 MMOL/L — SIGNIFICANT CHANGE UP (ref 135–145)
WBC # BLD: 7.74 K/UL — SIGNIFICANT CHANGE UP (ref 3.8–10.5)
WBC # FLD AUTO: 7.74 K/UL — SIGNIFICANT CHANGE UP (ref 3.8–10.5)

## 2023-10-04 PROCEDURE — 99284 EMERGENCY DEPT VISIT MOD MDM: CPT

## 2023-10-04 PROCEDURE — 71046 X-RAY EXAM CHEST 2 VIEWS: CPT | Mod: 26

## 2023-10-04 PROCEDURE — 93010 ELECTROCARDIOGRAM REPORT: CPT

## 2023-10-04 RX ORDER — SODIUM CHLORIDE 9 MG/ML
1000 INJECTION INTRAMUSCULAR; INTRAVENOUS; SUBCUTANEOUS ONCE
Refills: 0 | Status: COMPLETED | OUTPATIENT
Start: 2023-10-04 | End: 2023-10-04

## 2023-10-04 RX ADMIN — SODIUM CHLORIDE 1000 MILLILITER(S): 9 INJECTION INTRAMUSCULAR; INTRAVENOUS; SUBCUTANEOUS at 13:36

## 2023-10-04 NOTE — ED PROVIDER NOTE - PHYSICAL EXAMINATION
Physical exam  Well-appearing male in no respiratory distress  Vital signs stable other than heart rate of 106 at triage, patient is afebrile, not hypoxic  Clear to auscultation bilaterally  S1-S2 no murmurs rubs or gallops  Abdomen soft nontender nondistended  Extremities no edema

## 2023-10-04 NOTE — ED ADULT NURSE NOTE - NSFALLUNIVINTERV_ED_ALL_ED
Bed/Stretcher in lowest position, wheels locked, appropriate side rails in place/Call bell, personal items and telephone in reach/Instruct patient to call for assistance before getting out of bed/chair/stretcher/Non-slip footwear applied when patient is off stretcher/[place] to call system/Physically safe environment - no spills, clutter or unnecessary equipment/Purposeful proactive rounding/Room/bathroom lighting operational, light cord in reach

## 2023-10-04 NOTE — ED ADULT NURSE NOTE - NSSEPSISSUSPECTED_ED_A_ED
Please read and follow discharge instructions and return precautions.  Rest, avoid any strenuous activity, over exertion or overheating.  Keep well hydrated.  Pediatric Benadryl over-the-counter as directed if needed for rash, itching or allergic reaction.  Keep EpiPen available at all times for use if needed.  Important to see your pediatrician tomorrow.  Return immediately if you develop new or worsening symptoms or if you have new problems or concerns.  
No

## 2023-10-04 NOTE — ED PROVIDER NOTE - PROGRESS NOTE DETAILS
dispo placed as per dr. zimmerman instructions- he gave patient dc instructions and return precautions

## 2023-10-04 NOTE — ED PROVIDER NOTE - NSFOLLOWUPINSTRUCTIONS_ED_ALL_ED_FT
Please follow up with your primary care physician in 1 week    I have attached your lab and chest x-ray results    Please take mucinex for chest congestion    Return to ER for worsening symptoms, persistent fevers, or any other symptoms that you feel require immediate attention

## 2023-10-04 NOTE — ED ADULT NURSE NOTE - OBJECTIVE STATEMENT
pt with co chills, fever ,cough . pt states his son is sick. pt with cough  RVP collected, no fever, iv placed fluids given . pt awaiting dipo.

## 2023-10-04 NOTE — ED PROVIDER NOTE - CLINICAL SUMMARY MEDICAL DECISION MAKING FREE TEXT BOX
Impression  Likely viral syndrome given symptoms, possibly COVID due to vaccination status  We will get labs, RVP with COVID, chest x-ray, hydrate patient and reassess

## 2023-10-04 NOTE — ED PROVIDER NOTE - PATIENT PORTAL LINK FT
You can access the FollowMyHealth Patient Portal offered by [facility] by registering at the following website: [email or web address]. By joining [employer]’s FollowMyHealth portal, you will also be able to view your health information using other applications (apps) compatible with our system.

## 2023-10-04 NOTE — ED PROVIDER NOTE - OBJECTIVE STATEMENT
43-year-old male with past medical history of hypertension presents with 4 to 5 days of body aches, intermittent fevers, cough, chest congestion.  States son has had similar symptoms recently.  15 patient is not COVID vaccinated.  Patient denies chest pain but feels congestion and cough.  Patient denies altered mental status or neck pain.  Patient states feels dehydrated.

## 2023-11-27 NOTE — ED PROVIDER NOTE - NS_EDPROVIDERDISPOUSERTYPE_ED_A_ED
Attending Attestation (For Attendings USE Only)...
What Type Of Note Output Would You Prefer (Optional)?: Standard Output
Hpi Title: Evaluation of Skin Lesions
How Severe Are Your Spot(S)?: mild
Have Your Spot(S) Been Treated In The Past?: has not been treated
Family Member: Aunt

## 2024-01-01 ENCOUNTER — EMERGENCY (EMERGENCY)
Facility: HOSPITAL | Age: 44
LOS: 1 days | Discharge: HOME | End: 2024-01-01
Attending: STUDENT IN AN ORGANIZED HEALTH CARE EDUCATION/TRAINING PROGRAM | Admitting: STUDENT IN AN ORGANIZED HEALTH CARE EDUCATION/TRAINING PROGRAM
Payer: MEDICAID

## 2024-01-01 VITALS
RESPIRATION RATE: 17 BRPM | DIASTOLIC BLOOD PRESSURE: 100 MMHG | HEART RATE: 100 BPM | OXYGEN SATURATION: 96 % | TEMPERATURE: 98 F | SYSTOLIC BLOOD PRESSURE: 173 MMHG

## 2024-01-01 VITALS
TEMPERATURE: 99 F | SYSTOLIC BLOOD PRESSURE: 126 MMHG | HEART RATE: 114 BPM | DIASTOLIC BLOOD PRESSURE: 77 MMHG | RESPIRATION RATE: 16 BRPM | OXYGEN SATURATION: 97 %

## 2024-01-01 LAB
ALBUMIN SERPL ELPH-MCNC: 4 G/DL — SIGNIFICANT CHANGE UP (ref 3.3–5)
ALBUMIN SERPL ELPH-MCNC: 4 G/DL — SIGNIFICANT CHANGE UP (ref 3.3–5)
ALP SERPL-CCNC: 79 U/L — SIGNIFICANT CHANGE UP (ref 40–120)
ALP SERPL-CCNC: 79 U/L — SIGNIFICANT CHANGE UP (ref 40–120)
ALT FLD-CCNC: 24 U/L — SIGNIFICANT CHANGE UP (ref 4–41)
ALT FLD-CCNC: 24 U/L — SIGNIFICANT CHANGE UP (ref 4–41)
ANION GAP SERPL CALC-SCNC: 17 MMOL/L — HIGH (ref 7–14)
ANION GAP SERPL CALC-SCNC: 17 MMOL/L — HIGH (ref 7–14)
AST SERPL-CCNC: 25 U/L — SIGNIFICANT CHANGE UP (ref 4–40)
AST SERPL-CCNC: 25 U/L — SIGNIFICANT CHANGE UP (ref 4–40)
BASOPHILS # BLD AUTO: 0.05 K/UL — SIGNIFICANT CHANGE UP (ref 0–0.2)
BASOPHILS # BLD AUTO: 0.05 K/UL — SIGNIFICANT CHANGE UP (ref 0–0.2)
BASOPHILS NFR BLD AUTO: 0.4 % — SIGNIFICANT CHANGE UP (ref 0–2)
BASOPHILS NFR BLD AUTO: 0.4 % — SIGNIFICANT CHANGE UP (ref 0–2)
BILIRUB SERPL-MCNC: 0.5 MG/DL — SIGNIFICANT CHANGE UP (ref 0.2–1.2)
BILIRUB SERPL-MCNC: 0.5 MG/DL — SIGNIFICANT CHANGE UP (ref 0.2–1.2)
BUN SERPL-MCNC: 13 MG/DL — SIGNIFICANT CHANGE UP (ref 7–23)
BUN SERPL-MCNC: 13 MG/DL — SIGNIFICANT CHANGE UP (ref 7–23)
CALCIUM SERPL-MCNC: 8.2 MG/DL — LOW (ref 8.4–10.5)
CALCIUM SERPL-MCNC: 8.2 MG/DL — LOW (ref 8.4–10.5)
CHLORIDE SERPL-SCNC: 102 MMOL/L — SIGNIFICANT CHANGE UP (ref 98–107)
CHLORIDE SERPL-SCNC: 102 MMOL/L — SIGNIFICANT CHANGE UP (ref 98–107)
CO2 SERPL-SCNC: 19 MMOL/L — LOW (ref 22–31)
CO2 SERPL-SCNC: 19 MMOL/L — LOW (ref 22–31)
CREAT SERPL-MCNC: 0.88 MG/DL — SIGNIFICANT CHANGE UP (ref 0.5–1.3)
CREAT SERPL-MCNC: 0.88 MG/DL — SIGNIFICANT CHANGE UP (ref 0.5–1.3)
EGFR: 110 ML/MIN/1.73M2 — SIGNIFICANT CHANGE UP
EGFR: 110 ML/MIN/1.73M2 — SIGNIFICANT CHANGE UP
EOSINOPHIL # BLD AUTO: 0.05 K/UL — SIGNIFICANT CHANGE UP (ref 0–0.5)
EOSINOPHIL # BLD AUTO: 0.05 K/UL — SIGNIFICANT CHANGE UP (ref 0–0.5)
EOSINOPHIL NFR BLD AUTO: 0.4 % — SIGNIFICANT CHANGE UP (ref 0–6)
EOSINOPHIL NFR BLD AUTO: 0.4 % — SIGNIFICANT CHANGE UP (ref 0–6)
GLUCOSE SERPL-MCNC: 78 MG/DL — SIGNIFICANT CHANGE UP (ref 70–99)
GLUCOSE SERPL-MCNC: 78 MG/DL — SIGNIFICANT CHANGE UP (ref 70–99)
HCT VFR BLD CALC: 41 % — SIGNIFICANT CHANGE UP (ref 39–50)
HCT VFR BLD CALC: 41 % — SIGNIFICANT CHANGE UP (ref 39–50)
HGB BLD-MCNC: 14.2 G/DL — SIGNIFICANT CHANGE UP (ref 13–17)
HGB BLD-MCNC: 14.2 G/DL — SIGNIFICANT CHANGE UP (ref 13–17)
IANC: 8.69 K/UL — HIGH (ref 1.8–7.4)
IANC: 8.69 K/UL — HIGH (ref 1.8–7.4)
IMM GRANULOCYTES NFR BLD AUTO: 0.4 % — SIGNIFICANT CHANGE UP (ref 0–0.9)
IMM GRANULOCYTES NFR BLD AUTO: 0.4 % — SIGNIFICANT CHANGE UP (ref 0–0.9)
LYMPHOCYTES # BLD AUTO: 2.75 K/UL — SIGNIFICANT CHANGE UP (ref 1–3.3)
LYMPHOCYTES # BLD AUTO: 2.75 K/UL — SIGNIFICANT CHANGE UP (ref 1–3.3)
LYMPHOCYTES # BLD AUTO: 21.9 % — SIGNIFICANT CHANGE UP (ref 13–44)
LYMPHOCYTES # BLD AUTO: 21.9 % — SIGNIFICANT CHANGE UP (ref 13–44)
MCHC RBC-ENTMCNC: 29.1 PG — SIGNIFICANT CHANGE UP (ref 27–34)
MCHC RBC-ENTMCNC: 29.1 PG — SIGNIFICANT CHANGE UP (ref 27–34)
MCHC RBC-ENTMCNC: 34.6 GM/DL — SIGNIFICANT CHANGE UP (ref 32–36)
MCHC RBC-ENTMCNC: 34.6 GM/DL — SIGNIFICANT CHANGE UP (ref 32–36)
MCV RBC AUTO: 84 FL — SIGNIFICANT CHANGE UP (ref 80–100)
MCV RBC AUTO: 84 FL — SIGNIFICANT CHANGE UP (ref 80–100)
MONOCYTES # BLD AUTO: 0.99 K/UL — HIGH (ref 0–0.9)
MONOCYTES # BLD AUTO: 0.99 K/UL — HIGH (ref 0–0.9)
MONOCYTES NFR BLD AUTO: 7.9 % — SIGNIFICANT CHANGE UP (ref 2–14)
MONOCYTES NFR BLD AUTO: 7.9 % — SIGNIFICANT CHANGE UP (ref 2–14)
NEUTROPHILS # BLD AUTO: 8.69 K/UL — HIGH (ref 1.8–7.4)
NEUTROPHILS # BLD AUTO: 8.69 K/UL — HIGH (ref 1.8–7.4)
NEUTROPHILS NFR BLD AUTO: 69 % — SIGNIFICANT CHANGE UP (ref 43–77)
NEUTROPHILS NFR BLD AUTO: 69 % — SIGNIFICANT CHANGE UP (ref 43–77)
NRBC # BLD: 0 /100 WBCS — SIGNIFICANT CHANGE UP (ref 0–0)
NRBC # BLD: 0 /100 WBCS — SIGNIFICANT CHANGE UP (ref 0–0)
NRBC # FLD: 0 K/UL — SIGNIFICANT CHANGE UP (ref 0–0)
NRBC # FLD: 0 K/UL — SIGNIFICANT CHANGE UP (ref 0–0)
NT-PROBNP SERPL-SCNC: <36 PG/ML — SIGNIFICANT CHANGE UP
NT-PROBNP SERPL-SCNC: <36 PG/ML — SIGNIFICANT CHANGE UP
PLATELET # BLD AUTO: 193 K/UL — SIGNIFICANT CHANGE UP (ref 150–400)
PLATELET # BLD AUTO: 193 K/UL — SIGNIFICANT CHANGE UP (ref 150–400)
POTASSIUM SERPL-MCNC: 3.4 MMOL/L — LOW (ref 3.5–5.3)
POTASSIUM SERPL-MCNC: 3.4 MMOL/L — LOW (ref 3.5–5.3)
POTASSIUM SERPL-SCNC: 3.4 MMOL/L — LOW (ref 3.5–5.3)
POTASSIUM SERPL-SCNC: 3.4 MMOL/L — LOW (ref 3.5–5.3)
PROT SERPL-MCNC: 7.6 G/DL — SIGNIFICANT CHANGE UP (ref 6–8.3)
PROT SERPL-MCNC: 7.6 G/DL — SIGNIFICANT CHANGE UP (ref 6–8.3)
RBC # BLD: 4.88 M/UL — SIGNIFICANT CHANGE UP (ref 4.2–5.8)
RBC # BLD: 4.88 M/UL — SIGNIFICANT CHANGE UP (ref 4.2–5.8)
RBC # FLD: 13.5 % — SIGNIFICANT CHANGE UP (ref 10.3–14.5)
RBC # FLD: 13.5 % — SIGNIFICANT CHANGE UP (ref 10.3–14.5)
SODIUM SERPL-SCNC: 138 MMOL/L — SIGNIFICANT CHANGE UP (ref 135–145)
SODIUM SERPL-SCNC: 138 MMOL/L — SIGNIFICANT CHANGE UP (ref 135–145)
TROPONIN T, HIGH SENSITIVITY RESULT: 10 NG/L — SIGNIFICANT CHANGE UP
TROPONIN T, HIGH SENSITIVITY RESULT: 10 NG/L — SIGNIFICANT CHANGE UP
TROPONIN T, HIGH SENSITIVITY RESULT: 11 NG/L — SIGNIFICANT CHANGE UP
TROPONIN T, HIGH SENSITIVITY RESULT: 11 NG/L — SIGNIFICANT CHANGE UP
WBC # BLD: 12.58 K/UL — HIGH (ref 3.8–10.5)
WBC # BLD: 12.58 K/UL — HIGH (ref 3.8–10.5)
WBC # FLD AUTO: 12.58 K/UL — HIGH (ref 3.8–10.5)
WBC # FLD AUTO: 12.58 K/UL — HIGH (ref 3.8–10.5)

## 2024-01-01 PROCEDURE — 93010 ELECTROCARDIOGRAM REPORT: CPT

## 2024-01-01 PROCEDURE — 71046 X-RAY EXAM CHEST 2 VIEWS: CPT | Mod: 26

## 2024-01-01 PROCEDURE — 99285 EMERGENCY DEPT VISIT HI MDM: CPT

## 2024-01-01 PROCEDURE — 73110 X-RAY EXAM OF WRIST: CPT | Mod: 26,50

## 2024-01-01 RX ORDER — AMLODIPINE BESYLATE 2.5 MG/1
10 TABLET ORAL ONCE
Refills: 0 | Status: COMPLETED | OUTPATIENT
Start: 2024-01-01 | End: 2024-01-01

## 2024-01-01 RX ORDER — SODIUM CHLORIDE 9 MG/ML
1000 INJECTION INTRAMUSCULAR; INTRAVENOUS; SUBCUTANEOUS ONCE
Refills: 0 | Status: COMPLETED | OUTPATIENT
Start: 2024-01-01 | End: 2024-01-01

## 2024-01-01 RX ORDER — KETOROLAC TROMETHAMINE 30 MG/ML
15 SYRINGE (ML) INJECTION ONCE
Refills: 0 | Status: DISCONTINUED | OUTPATIENT
Start: 2024-01-01 | End: 2024-01-01

## 2024-01-01 RX ADMIN — Medication 15 MILLIGRAM(S): at 13:29

## 2024-01-01 RX ADMIN — SODIUM CHLORIDE 1000 MILLILITER(S): 9 INJECTION INTRAMUSCULAR; INTRAVENOUS; SUBCUTANEOUS at 13:29

## 2024-01-01 NOTE — ED ADULT NURSE NOTE - NSFALLUNIVINTERV_ED_ALL_ED
Bed/Stretcher in lowest position, wheels locked, appropriate side rails in place/Call bell, personal items and telephone in reach/Instruct patient to call for assistance before getting out of bed/chair/stretcher/Non-slip footwear applied when patient is off stretcher/[place] to call system/Physically safe environment - no spills, clutter or unnecessary equipment/Purposeful proactive rounding/Room/bathroom lighting operational, light cord in reach Bed/Stretcher in lowest position, wheels locked, appropriate side rails in place/Call bell, personal items and telephone in reach/Instruct patient to call for assistance before getting out of bed/chair/stretcher/Non-slip footwear applied when patient is off stretcher/[place] to call system/Physically safe environment - no spills, clutter or unnecessary equipment/Purposeful proactive rounding/Room/bathroom lighting operational, light cord in reach

## 2024-01-01 NOTE — ED PROVIDER NOTE - NSFOLLOWUPINSTRUCTIONS_ED_ALL_ED_FT
1) Please follow-up with your primary care doctor within the next 2-3 days.  Please call today for an appointment.   2) If you have any worsening of symptoms or any other concerns please return to the ED immediately.  3) Please take the medication you were prescribed today and continue taking your home medications as directed.  4) You may have been given a copy of your labs and/or imaging.  Please go over these with your primary care doctor.    Nonspecific Chest Pain      Chest pain can be caused by many different conditions. Some causes of chest pain can be life-threatening. These will require treatment right away. Serious causes of chest pain include:  •Heart attack.      •A tear in the body's main blood vessel.      •Redness and swelling (inflammation) around your heart.      •Blood clot in your lungs.      Other causes of chest pain may not be so serious. These include:  •Heartburn.      •Anxiety or stress.      •Damage to bones or muscles in your chest.      •Lung infections.      Chest pain can feel like:  •Pain or discomfort in your chest.      •Crushing, pressure, aching, or squeezing pain.       •Burning or tingling.       •Dull or sharp pain that is worse when you move, cough, or take a deep breath.       •Pain or discomfort that is also felt in your back, neck, jaw, shoulder, or arm, or pain that spreads to any of these areas.      It is hard to know whether your pain is caused by something that is serious or something that is not so serious. So it is important to see your doctor right away if you have chest pain.      Follow these instructions at home:    Medicines     •Take over-the-counter and prescription medicines only as told by your doctor.      •If you were prescribed an antibiotic medicine, take it as told by your doctor. Do not stop taking the antibiotic even if you start to feel better.        Lifestyle   A plate along with examples of foods in a healthy diet.   •Rest as told by your doctor.      • Do not use any products that contain nicotine or tobacco, such as cigarettes, e-cigarettes, and chewing tobacco. If you need help quitting, ask your doctor.      • Do not drink alcohol.    •Make lifestyle changes as told by your doctor. These may include:  •Getting regular exercise. Ask your doctor what activities are safe for you.      •Eating a heart-healthy diet. A diet and nutrition specialist (dietitian) can help you to learn healthy eating options.      •Staying at a healthy weight.      •Treating diabetes or high blood pressure, if needed.      •Lowering your stress. Activities such as yoga and relaxation techniques can help.        General instructions     •Pay attention to any changes in your symptoms. Tell your doctor about them or any new symptoms.      •Avoid any activities that cause chest pain.      •Keep all follow-up visits as told by your doctor. This is important. You may need more testing if your chest pain does not go away.        Contact a doctor if:    •Your chest pain does not go away.      •You feel depressed.      •You have a fever.        Get help right away if:    •Your chest pain is worse.      •You have a cough that gets worse, or you cough up blood.      •You have very bad (severe) pain in your belly (abdomen).      •You pass out (faint).    •You have either of these for no clear reason:  •Sudden chest discomfort.      •Sudden discomfort in your arms, back, neck, or jaw.        •You have shortness of breath at any time.      •You suddenly start to sweat, or your skin gets clammy.      •You feel sick to your stomach (nauseous).      •You throw up (vomit).      •You suddenly feel lightheaded or dizzy.      •You feel very weak or tired.      •Your heart starts to beat fast, or it feels like it is skipping beats.      These symptoms may be an emergency. Do not wait to see if the symptoms will go away. Get medical help right away. Call your local emergency services (911 in the [place] Do not drive yourself to the hospital.       Summary    •Chest pain can be caused by many different conditions. The cause may be serious and need treatment right away. If you have chest pain, see your doctor right away.
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
## 2024-01-01 NOTE — ED ADULT NURSE NOTE - ISOLATION TYPE:
None
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
## 2024-01-01 NOTE — ED PROVIDER NOTE - PATIENT PORTAL LINK FT
You can access the FollowMyHealth Patient Portal offered by [facility] by registering at the following website: [email or web address]. By joining [employer]’s FollowMyHealth portal, you will also be able to view your health information using other applications (apps) compatible with our system. You can access the FollowMyHealth Patient Portal offered by [facility] by registering at the following website: [email or web address]. By joining [employer]’s FollowMyHealth portal, you will also be able to view your health information using other applications (apps) compatible with our system.

## 2024-01-01 NOTE — ED PROVIDER NOTE - EKG ADDITIONAL INFORMATION FREE TEXT
Sinus tachycardia normal axis, normal intervals, submillimeter T wave inversion V4, V5, V6, T wave inversion similar to prior.  My interpretation sinus tachycardia without evidence of acute ischemia.

## 2024-01-01 NOTE — ED PROVIDER NOTE - NS ED ROS FT
Constitutional: No fever. no chills. +body aches no unexpected weight loss/gain  Eyes: No visual changes, eye pain or redness  HEENT: No throat pain, hearing changes, ear pain, nasal pain. No nose bleeding.  CV: +chest pain, no palpitations  Resp: +shortness of breath, no cough  GI: No abdominal pain. No nausea. no  vomiting. No diarrhea. No constipation.   : No dysuria, hematuria. no urinary frequency, no urinary urgency.  MSK:see hpi. no head trauma  Skin: No rash, lesions, no bruises  Neuro: No headache. No numbness or tingling. No weakness. No dizziness.  Allergy/Immunology: no allergy to medicine or food.

## 2024-01-01 NOTE — ED PROVIDER NOTE - PROGRESS NOTE DETAILS
Labs and x-ray without significant abnormality. Patient declined flu/COVID swab.Patient tolerating p.o. well.  No more chest pain or shortness breath. Patient to follow-up outpatient with PCP regarding continued diabetes treatment.  Patient reports improvement of wrist pain, no snuffbox tenderness present on initial exam on reassessment.  Return precautions discussed with patient at bedside.  Patient to be discharged with PD custody. Labs and x-ray without significant abnormality. Patient declined flu/COVID swab.Patient tolerating p.o. well.  No more chest pain or shortness breath. Patient to follow-up outpatient with PCP regarding continued diabetes treatment.  Patient reports improvement of wrist pain, no snuffbox tenderness present on initial exam on reassessment.  Return precautions discussed with patient at bedside.  Patient to be discharged with PD custody.Patient without headache vision hearing changes numbness or weakness.  Elevated BP noted.  Patient reports now he also takes hydrochlorothiazide 25 mg daily.  Offered to give medication.  Patient declining reporting he does not want to be urinating excessively while in custody.  Offered patient 12.5 mg instead but he declines.  Recommended patient resume normal regimen when he is released..  Patient is due for amlodipine next morning patient understands if he still in custody should request medication.

## 2024-01-01 NOTE — ED PROVIDER NOTE - CLINICAL SUMMARY MEDICAL DECISION MAKING FREE TEXT BOX
Chest pain shortness of breath intermittent since last night without associated nausea, vomiting, dizziness, diaphoresis, radiation of pain.  Patient reports mild chest pain at this time denies shortness of breath.  Lungs clear to auscultation.  No extremity swelling.  Patient does report body aches.  Patient also does report bilateral wrist pain from as he reports handcuffs being too tight.  Patient currently [place] custody.  Patient is mildly somnolent however arousable, neuroexam nonfocal.  Plan: Labs, x-ray, symptom leave, reassess.  Triage mentions hypertension and hyperglycemia.  Fingerstick 89.  Currently normotensive.  Will give home dose of amlodipine.  Patient not on insulins for diabetes.
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
## 2024-01-01 NOTE — ED PROVIDER NOTE - PHYSICAL EXAMINATION
GEN: no acute respiratory distress. nontoxic, speaking comfortably in full sentences, ambulating with steady gait.  HEENT: NCAT. face symmetrical. PERRL 4mm, EOMI, MMM, oropharynx wnl.  Neck: no JVD, trachea midline, no lymphadenopathy, FROM  CV: RRR. +S1S2, no murmur. 2+ pulses in 4 extremities, cap refill <2 sec  Chest: CTA B/l. no wheezing, rales, rhonchi. no retractions. good air movement.   ABD: +BS, soft, non distended, non tender.   : no cva or suprapubic tenderness  MSK: No clubbing, cyanosis, edema. FROM of all extremities. very tenderness over distal rad/ulnar region  bilaterally to palpation. FROM of all extremities. No midline or paraspinal tenderness. no spinal step-offs. 2+ pulses radial ulnar bilaterally.  Normal cap refill.  Neuro: AAOX3.  Sensation intact, motor 5/5 throughout.   SKIN: No erythema, lesions or rash. no ecchymosis, laceration or abrasion

## 2024-01-01 NOTE — ED PROVIDER NOTE - OBJECTIVE STATEMENT
41-year-old male past medical history hypertension amlodipine, diabetes presents to ED in [facility] custody for intermittent chest pain and shortness of breath since last night.  He denies fever, chills.  Does report body aches.  He denies nausea vomiting diaphoresis sore throat.  Denies any abdominal pain abnormal bowel movements dysuria.  Patient does report bilateral wrist pain in region where he says handcuffs were.  He denies vision changes hearing changes sensory changes.  Does smoke tobacco regularly.  Requesting home dose of amlodipine 10 mg.  Per triage reportedly came for elevated blood pressure and glucose fingerstick at triage 89.  Per [facility] officer at bedside patient in custody for approximately 3 hours. 41-year-old male past medical history hypertension amlodipine, diabetes presents to ED in [facility] custody for intermittent chest pain and shortness of breath since last night.  He denies fever, chills.  Does report body aches.  He denies nausea vomiting diaphoresis sore throat.  Denies any abdominal pain abnormal bowel movements dysuria.  Patient does report bilateral wrist pain in region where he says handcuffs were.  He denies vision changes hearing changes sensory changes.  Does smoke tobacco regularly.  Requesting home dose of amlodipine 10 mg.  Per triage reportedly came for elevated blood pressure and glucose fingerstick at triage 89.  Per [facility] officer at bedside patient in custody for approximately 3 hours.

## 2024-01-22 NOTE — ED PROVIDER NOTE - ATTENDING WITH...
Consultation -  [facility]  Gael Michaellefaby 86 y.o. male MRN: [id number]  Unit/Bed#: S -01 Encounter: [id number]        Inpatient consult to gastroenterology  Consult performed by: Vishal Loaiza MD  Consult ordered by: Radha Duran PA-C        Reason for Consult / Principal Problem:   Trouble swallowing, concern for blood loss anemia, on DAPT, History of hiatal hernia, worsening indigestion, weight loss       ASSESSMENT AND PLAN:      Chest pain   Extensive cardiac history including Multivessel CAD s/p PCI, HTN, CKD, HLD, Macrocytic anemia, Esophageal dysphagia, Hiatal hernia, Schatzki's ring, GERD and MARK   Reports currently experiencing no symtpoms. Chest pain completely resolved after pRBCs transfusion.   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 However, he has not had any scoping recently.   Cardiology following - Requires clearance from cardiology for invasive GI procedures if indicated      Concern for possible blood loss anemia in the setting of DAPT   Home regimen includes ASA and Brilinta for MVCAD  HB on presentation was 6.4, improved to 8.8 after x2 pRBCs   Denies black or tarry stools. Denies noticing streaks of blood when wiping. Also, denies hematuria and changes in urine color.   Cardiology following - Continued on DAPT  Hematology following - Plan for Bone marrow Bx via IR on Wednesday     Dysphagia   History of Dyphagia since 2021   Fluoroscopic study from 2021 - Small to moderate hiatal hernia and findings consistent with Schatzki's ring. Tertiary contractions. Small to moderate volume of gastroesophageal reflux elicited. Mild delay in passage from the mid thoracic esophagus in distal esophagus of a barium tablet  Dysphagia is infrequent with solid food only. Says it waxes and wanes. Says when food gets stuck he coughs and comes out, and usually does not have a repeat episode during the same meal. Denies  "odynophagia, abdominal pain, N/V, reflux, and sour taste in the mouth. Denies dysphagia while inpatient, but of note does have low appetite.   Follows a full regular diet at home. Can continue with regular diet - recommended he take small bites and chew food well  Dry oral mucosa noted - recommended to keep hydrated   Plan for Barium esophagram, NPO at midnight   Possible EGD later this week - will need clearance from cardiology       ______________________________________________________________________    HPI:  Gael DIAS is an 87 Y/o male with hx of Multivessel CAD s/p PCI, HTN, CKD, HLD, Macrocytic anemia, Esophageal dysphagia, Hiatal hernia,  Schatzki's ring, GERD and MARK. Admitted on 1/21/2023 with the complaint of chest pain. It radiated to jaw and R arm. Had ECG changes of ST depression with troponin elevation and Hb of 6.4. Reports recent episodes of chest pain over the last weeks. His presenting chest pain developed after standing from sitting, took an aspirin and called EMS. Improved after ASA and O2 supplementation. Denied recent bleeds or bleeding on presentation. On presentations, received x2 pRBCs. Of note, patient had been following with Hematology at [facility] with plans of BM bx in early Feb. Reports weight loss and wife at bedsides reports he eats very little and \"eats only the necessary amount\" that he thinks he needs. He says that food doesn't taste the same. Recent admission with similar presentation of chest pain, was diagnosed with Type 2 MI 2/2 anemia and cath showed multi vessel disease. Being followed inpatient by Cardiology and Hematology/Oncology.      Currently, denies chest pain. Had a BM last night of normal consistency, denies changes in consistency, and denied changes in color (remains brown-dark brown, no black or tarry stools). Reports very occasional dysphagia, that has not increased in frequency, with solid foods, and denies choking and vomiting. This has been happening since 2021. " Had fluoroscopy, but doesn't recall following with gastroenterology.     Reports family history of mother with liver cancer with mets. Stopped smoking 30 years ago and consumes alcohol 1-2 drinks during social events/holidays.       REVIEW OF SYSTEMS:    CONSTITUTIONAL: Decreased appetite, weight loss. Denies any fever, chills, rigors.   HEENT: Dysphagia. No earache or tinnitus. Denies hearing loss or visual disturbances.  CARDIOVASCULAR: No chest pain or palpitations.   RESPIRATORY: Denies any cough, hemoptysis, shortness of breath or dyspnea on exertion.  GASTROINTESTINAL: As noted in the History of Present Illness.   GENITOURINARY: No problems with urination. Denies any hematuria or dysuria.  NEUROLOGIC: No dizziness or vertigo, denies headaches.   MUSCULOSKELETAL: Denies any muscle or joint pain.   SKIN: Denies skin rashes or itching.   ENDOCRINE: Denies excessive thirst. Denies intolerance to heat or cold.  PSYCHOSOCIAL: Denies depression or anxiety. Denies any recent memory loss.       Historical Information   No past medical history on file.  Past Surgical History:   Procedure Laterality Date    CARDIAC CATHETERIZATION Left 1/2/2024    Procedure: Cardiac Left Heart Cath;  Surgeon: Ana Garcia DO;  [facility];  Service: Cardiology    CARDIAC CATHETERIZATION N/A 1/2/2024    Procedure: Cardiac Coronary Angiogram;  Surgeon: Ana Garcia DO;  [facility];  Service: Cardiology    CARDIAC CATHETERIZATION N/A 1/2/2024    Procedure: Cardiac RHC;  Surgeon: Ana Garcia DO;  [facility];  Service: Cardiology     Social History   Social History     Substance and Sexual Activity   Alcohol Use Not on file     Social History     Substance and Sexual Activity   Drug Use Not on file     Social History     Tobacco Use   Smoking Status Not on file   Smokeless Tobacco Not on file     No family history on file.    Meds/Allergies     Medications Prior to  "Admission   Medication    acetaminophen (TYLENOL) 325 mg tablet    aspirin (ECOTRIN LOW STRENGTH) 81 mg EC tablet    atorvastatin (LIPITOR) 20 mg tablet    Cholecalciferol 50 MCG (2000 UT) CAPS    cyanocobalamin (VITAMIN B-12) 1000 MCG tablet    hydrochlorothiazide (HYDRODIURIL) 25 mg tablet    LORazepam (ATIVAN) 1 mg tablet    nebivolol (BYSTOLIC) 20 MG tablet    potassium chloride (MICRO-K) 10 MEQ CR capsule    rosuvastatin (CRESTOR) 10 MG tablet    ticagrelor (BRILINTA) 90 MG     Current Facility-Administered Medications   Medication Dose Route Frequency    acetaminophen (TYLENOL) tablet 650 mg  650 mg Oral Q6H PRN    amLODIPine (NORVASC) tablet 2.5 mg  2.5 mg Oral Daily    aspirin (ECOTRIN LOW STRENGTH) EC tablet 81 mg  81 mg Oral Daily    aspirin chewable tablet 162 mg  162 mg Oral Once    cholecalciferol (VITAMIN D3) tablet 2,000 Units  2,000 Units Oral Daily    cyanocobalamin (VITAMIN B-12) tablet 1,000 mcg  1,000 mcg Oral Daily    hydrochlorothiazide (HYDRODIURIL) tablet 25 mg  25 mg Oral Daily    LORazepam (ATIVAN) tablet 1 mg  1 mg Oral BID PRN    nebivolol (BYSTOLIC) tablet 20 mg  20 mg Oral Daily    nitroglycerin (NITROSTAT) SL tablet 0.4 mg  0.4 mg Sublingual Q5 Min PRN    potassium chloride (K-DUR,KLOR-CON) CR tablet 10 mEq  10 mEq Oral Daily    pravastatin (PRAVACHOL) tablet 80 mg  80 mg Oral Daily With Dinner    ranolazine (RANEXA) 12 hr tablet 500 mg  500 mg Oral Q12H CRESENCIO    sodium chloride (PF) 0.9 % injection 3 mL  3 mL Intravenous Q1H PRN    ticagrelor (BRILINTA) tablet 90 mg  90 mg Oral Q12H CRESENCIO       Allergies   Allergen Reactions    Hydrocodone-Acetaminophen GI Intolerance    Niacin GI Intolerance    Penicillins Hives           Objective     Blood pressure 124/56, pulse 66, temperature 97.6 °F (36.4 °C), temperature source Oral, resp. rate 16, height 5' 10\" (1.778 m), weight 68 kg (149 lb 14.6 oz), SpO2 96%. Body mass index is 21.51 kg/m².    No intake or output data in the 24 hours ending " 01/22/24 1140      PHYSICAL EXAM:      General Appearance:   Alert, cooperative, no distress   HEENT:   Dry oral mucosa. Normocephalic, atraumatic, anicteric.     Neck:  Supple, symmetrical, trachea midline   Lungs:   Clear to auscultation bilaterally; no rales, rhonchi or wheezing; respirations unlabored    Heart::   Regular rate and rhythm; no murmur, rub, or gallop.   Abdomen:   Soft, non-tender, non-distended; normal bowel sounds; no masses, no organomegaly    Genitalia:   Deferred    Rectal:   Deferred    Extremities:  No cyanosis, clubbing or edema    Pulses:  2+ and symmetric all extremities    Skin:  No jaundice, rashes, or lesions    Lymph nodes:  No palpable cervical lymphadenopathy        Lab Results:   Admission on 01/21/2024   Component Date Value    POC Glucose 01/21/2024 136     ph, Royal ISTAT 01/21/2024 7.522 (HH)     pCO2, Royal i-STAT 01/21/2024 28.8 (LL)     pO2, Royal i-STAT 01/21/2024 21.0 (L)     BE, i-STAT 01/21/2024 1     HCO3, Royal i-STAT 01/21/2024 23.6 (L)     CO2, i-STAT 01/21/2024 25     O2 Sat, i-STAT 01/21/2024 44 (L)     SODIUM, I-STAT 01/21/2024 138     Potassium, i-STAT 01/21/2024 3.9     Calcium, Ionized i-STAT 01/21/2024 1.12     Hct, i-STAT 01/21/2024 19 (L)     Hgb, i-STAT 01/21/2024 6.5 (LL)     Glucose, i-STAT 01/21/2024 142 (H)     Specimen Type 01/21/2024 VENOUS     WBC 01/21/2024 5.88     RBC 01/21/2024 1.92 (L)     Hemoglobin 01/21/2024 6.4 (LL)     Hematocrit 01/21/2024 19.2 (L)     MCV 01/21/2024 100 (H)     MCH 01/21/2024 33.3     MCHC 01/21/2024 33.3     RDW 01/21/2024 13.4     MPV 01/21/2024 10.8     Platelets 01/21/2024 331     nRBC 01/21/2024 0     Neutrophils Relative 01/21/2024 73     Immat GRANS % 01/21/2024 0     Lymphocytes Relative 01/21/2024 18     Monocytes Relative 01/21/2024 7     Eosinophils Relative 01/21/2024 2     Basophils Relative 01/21/2024 0     Neutrophils Absolute 01/21/2024 4.25     Immature Grans Absolute 01/21/2024 0.02     Lymphocytes Absolute  01/21/2024 1.05     Monocytes Absolute 01/21/2024 0.42     Eosinophils Absolute 01/21/2024 0.13     Basophils Absolute 01/21/2024 0.01     Cholesterol 01/21/2024 141     Triglycerides 01/21/2024 219 (H)     HDL, Direct 01/21/2024 34 (L)     LDL Calculated 01/21/2024 63     Non-HDL-Chol (CHOL-HDL) 01/21/2024 107     Magnesium 01/21/2024 1.9     Protime 01/21/2024 13.5     INR 01/21/2024 0.97     PTT 01/21/2024 23     hs TnI 0hr 01/21/2024 417 (H)     Sodium 01/21/2024 137     Potassium 01/21/2024 3.8     Chloride 01/21/2024 102     CO2 01/21/2024 26     ANION GAP 01/21/2024 9     BUN 01/21/2024 25     Creatinine 01/21/2024 1.08     Glucose 01/21/2024 139     Calcium 01/21/2024 9.4     AST 01/21/2024 31     ALT 01/21/2024 61 (H)     Alkaline Phosphatase 01/21/2024 80     Total Protein 01/21/2024 6.8     Albumin 01/21/2024 4.1     Total Bilirubin 01/21/2024 0.54     eGFR 01/21/2024 62     ABO Grouping 01/21/2024 A     Rh Factor 01/21/2024 Negative     Antibody Screen 01/21/2024 Negative     Specimen Expiration Date 01/21/2024 20240124     hs TnI 2hr 01/21/2024 2,161 (H)     Delta 2hr hsTnI 01/21/2024 1,744 (H)     Unit Product Code 01/22/2024 K2983X94     Unit Number 01/22/2024 [id number]     Unit ABO 01/22/2024 A     Unit RH 01/22/2024 NEG     Crossmatch 01/22/2024 Compatible     Unit Dispense Status 01/22/2024 Presumed Trans     Unit Product Volume 01/22/2024 350     Unit Product Code 01/22/2024 [id number]     Unit Number 01/22/2024 [id number]     Unit ABO 01/22/2024 A     Unit RH 01/22/2024 NEG     Crossmatch 01/22/2024 Compatible     Unit Dispense Status 01/22/2024 Presumed Trans     Unit Product Volume 01/22/2024 350     hs TnI 4hr 01/21/2024 6,138 (H)     Delta 4hr hsTnI 01/21/2024 5,721 (H)     Hemoglobin A1C 01/21/2024 7.5 (H)     EAG 01/21/2024 169     Hemoglobin 01/21/2024 8.8 (L)     Vitamin B-12 01/21/2024 422     Retic Ct Abs 01/21/2024 13,600 (L)     Retic Ct Pct 01/21/2024 0.50     LD 01/21/2024  211     TSH 3RD GENERATON 01/21/2024 1.299     DIRECT FORREST 01/21/2024 Negative     Iron Saturation 01/21/2024      TIBC 01/21/2024 <339     Iron 01/21/2024 284 (H)     UIBC 01/21/2024 <55 (L)     Ferritin 01/21/2024 1,145 (H)     Ventricular Rate 01/21/2024 72     Atrial Rate 01/21/2024 72     OK Interval 01/21/2024 156     QRSD Interval 01/21/2024 88     QT Interval 01/21/2024 394     QTC Interval 01/21/2024 431     P Axis 01/21/2024 68     QRS Axis 01/21/2024 52     T Wave Axis 01/21/2024 -5     Ventricular Rate 01/21/2024 72     Atrial Rate 01/21/2024 72     OK Interval 01/21/2024 150     QRSD Interval 01/21/2024 94     QT Interval 01/21/2024 410     QTC Interval 01/21/2024 448     P Axis 01/21/2024 74     QRS [place] 01/21/2024 57     T Wave Axis 01/21/2024 -26     Ventricular Rate 01/21/2024 73     Atrial Rate 01/21/2024 73     OK Interval 01/21/2024 148     QRSD Interval 01/21/2024 90     QT Interval 01/21/2024 408     QTC Interval 01/21/2024 449     P Axis 01/21/2024 74     QRS Axis 01/21/2024 60     T Wave [place] 01/21/2024 -74     Ventricular Rate 01/21/2024 75     Atrial Rate 01/21/2024 75     OK Interval 01/21/2024 144     QRSD Interval 01/21/2024 90     QT Interval 01/21/2024 400     QTC Interval 01/21/2024 446     P Axis 01/21/2024 69     QRS Axis 01/21/2024 49     T Wave Axis 01/21/2024 -51     Ventricular Rate 01/21/2024 84     Atrial Rate 01/21/2024 84     OK Interval 01/21/2024 144     QRSD Interval 01/21/2024 88     QT Interval 01/21/2024 378     QTC Interval 01/21/2024 446     P Axis 01/21/2024 70     QRS Axis 01/21/2024 52     T Wave Axis 01/21/2024 -72        Imaging Studies: I have personally reviewed pertinent imaging studies   Resident

## 2024-05-01 ENCOUNTER — APPOINTMENT (OUTPATIENT)
Dept: CARDIOLOGY | Facility: CLINIC | Age: 44
End: 2024-05-01
Payer: MEDICAID

## 2024-05-01 ENCOUNTER — NON-APPOINTMENT (OUTPATIENT)
Age: 44
End: 2024-05-01

## 2024-05-01 VITALS
HEIGHT: 70 IN | OXYGEN SATURATION: 96 % | HEART RATE: 74 BPM | WEIGHT: 263.13 LBS | DIASTOLIC BLOOD PRESSURE: 74 MMHG | SYSTOLIC BLOOD PRESSURE: 124 MMHG | BODY MASS INDEX: 37.67 KG/M2

## 2024-05-01 DIAGNOSIS — R07.89 OTHER CHEST PAIN: ICD-10-CM

## 2024-05-01 DIAGNOSIS — I10 ESSENTIAL (PRIMARY) HYPERTENSION: ICD-10-CM

## 2024-05-01 PROCEDURE — 93000 ELECTROCARDIOGRAM COMPLETE: CPT

## 2024-05-01 PROCEDURE — G2211 COMPLEX E/M VISIT ADD ON: CPT | Mod: NC,1L

## 2024-05-01 PROCEDURE — 99204 OFFICE O/P NEW MOD 45 MIN: CPT | Mod: 25

## 2024-05-01 RX ORDER — HYDROCHLOROTHIAZIDE 25 MG/1
25 TABLET ORAL
Refills: 0 | Status: ACTIVE | COMMUNITY

## 2024-05-01 RX ORDER — PANTOPRAZOLE 40 MG/1
40 TABLET, DELAYED RELEASE ORAL
Refills: 0 | Status: ACTIVE | COMMUNITY

## 2024-05-01 RX ORDER — AMLODIPINE AND ATORVASTATIN 10; 10 MG/1; MG/1
10-10 TABLET, COATED ORAL
Refills: 0 | Status: ACTIVE | COMMUNITY

## 2024-05-02 LAB
ALBUMIN SERPL ELPH-MCNC: 4.5 G/DL
ALP BLD-CCNC: 81 U/L
ALT SERPL-CCNC: 28 U/L
ANION GAP SERPL CALC-SCNC: 13 MMOL/L
AST SERPL-CCNC: 26 U/L
BILIRUB SERPL-MCNC: 0.4 MG/DL
BUN SERPL-MCNC: 11 MG/DL
CALCIUM SERPL-MCNC: 8.7 MG/DL
CHLORIDE SERPL-SCNC: 105 MMOL/L
CHOLEST SERPL-MCNC: 165 MG/DL
CO2 SERPL-SCNC: 22 MMOL/L
CREAT SERPL-MCNC: 1.01 MG/DL
EGFR: 95 ML/MIN/1.73M2
ESTIMATED AVERAGE GLUCOSE: 111 MG/DL
GLUCOSE SERPL-MCNC: 89 MG/DL
HBA1C MFR BLD HPLC: 5.5 %
HCT VFR BLD CALC: 44.6 %
HDLC SERPL-MCNC: 38 MG/DL
HGB BLD-MCNC: 14.6 G/DL
LDLC SERPL CALC-MCNC: 111 MG/DL
MCHC RBC-ENTMCNC: 29 PG
MCHC RBC-ENTMCNC: 32.7 GM/DL
MCV RBC AUTO: 88.5 FL
NONHDLC SERPL-MCNC: 127 MG/DL
PLATELET # BLD AUTO: 206 K/UL
POTASSIUM SERPL-SCNC: 4.2 MMOL/L
PROT SERPL-MCNC: 7.5 G/DL
RBC # BLD: 5.04 M/UL
RBC # FLD: 14 %
SODIUM SERPL-SCNC: 140 MMOL/L
TRIGL SERPL-MCNC: 83 MG/DL
TSH SERPL-ACNC: 1.79 UIU/ML
WBC # FLD AUTO: 7.61 K/UL

## 2024-05-02 NOTE — REASON FOR VISIT
[FreeTextEntry1] : Liu barry 44 years old here for evaluation of his cardiac status.  He has been having some epigastric burning.  He has a history of hypertension and is overweight.  He has not had a formal cardiac evaluation or blood work for some time

## 2024-05-02 NOTE — PHYSICAL EXAM
[Well Developed] : well developed [Well Nourished] : well nourished [Normal S1, S2] : normal S1, S2 [No Murmur] : no murmur [Clear Lung Fields] : clear lung fields [Soft] : abdomen soft [Non Tender] : non-tender [Normal Gait] : normal gait [No Edema] : no edema [de-identified] : Protuberant abdomen [de-identified] : No murmur even with provocative maneuvers [de-identified] : Protuberant

## 2024-05-02 NOTE — ASSESSMENT
[FreeTextEntry1] : Liu Barahona 44 years old here for general evaluation of his cardiac status.  He has been complaining of epigastric burning nonexertional which most likely is GI in nature.  He is scheduled for endoscopy and needs cardiac evaluation prior to the endoscopy because of his symptoms of epigastric discomfort.  His blood pressure is very well-controlled and he has no exertional symptoms.  His physical exam is normal as is his EKG.  He does continue to smoke about half to 1 pack/day which we discussed in detail  1.  Elevated BMI 2.  Epigastric burning usually after eating nonexertional likely some form of gastroesophageal reflux disease being evaluated by GI 3.  The patient has not had any general bloods or evaluation. 4.  2D echo and stress test have been ordered to complete a cardiac workup prior to him undergoing the endoscopy

## 2024-05-02 NOTE — HISTORY OF PRESENT ILLNESS
[FreeTextEntry1] : Liu Barahona is an overall healthy 44-year-old with an elevated BMI history of hypertension on amlodipine 10 and hydrochlorothiazide 25.  He has had no significant hospitalizations..  He did go to the emergency room at some point with some vague palpitations and EKG and was sent home was told to follow-up with cardiologist  Subsequent to this he has been complaining of epigastric burning upper chest discomfort nonexertional.  He was given pantoprazole.  He is being followed by gastroenterology and he is post to have an endoscopy and colonoscopy and is here for his cardiac evaluation preop cardiovascular evaluation prior to undergoing the endoscopy  When he exercises he feels well and has no chest pain chest pressure shortness of breath palpitations or dizziness.  He has no headaches.  He has never had syncope  EKG May 1, 2024 normal sinus rhythm within normal limits

## 2024-05-02 NOTE — DISCUSSION/SUMMARY
[FreeTextEntry1] : 1.  2D echo 2.  Exercise stress test both to be performed the same day 3.  Follow-up with me immediately after these tests 4.  Bloods have been drawn initial results indicate no significant abnormality except for slightly low HDL hemoglobin A1c is quite low LDL is above 100.  This will need observation better diet 5.  Cessation of smoking discussed in detail [EKG obtained to assist in diagnosis and management of assessed problem(s)] : EKG obtained to assist in diagnosis and management of assessed problem(s)

## 2024-06-05 ENCOUNTER — APPOINTMENT (OUTPATIENT)
Dept: CARDIOLOGY | Facility: CLINIC | Age: 44
End: 2024-06-05

## 2024-06-07 ENCOUNTER — APPOINTMENT (OUTPATIENT)
Dept: CARDIOLOGY | Facility: CLINIC | Age: 44
End: 2024-06-07

## 2024-07-09 ENCOUNTER — APPOINTMENT (OUTPATIENT)
Dept: GASTROENTEROLOGY | Facility: CLINIC | Age: 44
End: 2024-07-09

## 2024-08-07 NOTE — ED ADULT TRIAGE NOTE - MEANS OF ARRIVAL
"Chief Complaint:   Hyperlipidemia      History Of Present Illness:    Devang Leonard is a 40 y.o. male here with Hyperlipidemia. On statin.     Father  at 55 year old from MI.       Allergies:  Patient has no known allergies.    Review of Systems  All pertinent systems have been reviewed and are negative except for what is stated in the history of present illness.    All other systems have been reviewed and are negative and noncontributory to this patient's current ailments.     Visit Vitals  /89   Pulse 91   Temp 36.4 °C (97.5 °F)   Ht 1.981 m (6' 6\")   Wt 82.5 kg (181 lb 14.4 oz)   BMI 21.02 kg/m²   Smoking Status Never   BSA 2.13 m²       Last Labs:  CBC -  Lab Results   Component Value Date    WBC 7.4 2024    HGB 16.3 2024    HCT 48.6 2024    MCV 89 2024     2024       CMP -  Lab Results   Component Value Date    CALCIUM 9.5 2024    PROT 7.0 2024    ALBUMIN 4.8 2024    AST 19 2024    ALT 20 2024    ALKPHOS 61 2024    BILITOT 1.2 2024    BUN 13 2024    CREATININE 0.86 2024       LIPID PANEL -   Lab Results   Component Value Date    CHOL 194 2024    TRIG 122 2024    HDL 70.1 2024    CHHDL 2.8 2024    LDLF 54 2023    VLDL 24 2024    NHDL 124 2024       RENAL FUNCTION PANEL -   Lab Results   Component Value Date    GLUCOSE 87 2024     2024    K 4.5 2024     2024    CO2 29 2024    ANIONGAP 14 2024    BUN 13 2024    CREATININE 0.86 2024    GFRMALE >90 2023    CALCIUM 9.5 2024    ALBUMIN 4.8 2024        No results found for: \"BNP\", \"HGBA1C\"      Objective   Vitals reviewed.   Constitutional:       Appearance: Healthy appearance. Not in distress.   Eyes:      Conjunctiva/sclera: Conjunctivae normal.   Neck:      Vascular: No JVR. JVD normal.   Pulmonary:      Effort: Pulmonary effort is normal.      " Breath sounds: Normal breath sounds. No wheezing. No rhonchi. No rales.   Chest:      Chest wall: Not tender to palpatation.   Cardiovascular:      PMI at left midclavicular line. Normal rate. Regular rhythm. Normal S1. Normal S2.       Murmurs: There is no murmur.      No gallop.  No click. No rub.   Edema:     Peripheral edema absent.   Abdominal:      General: Bowel sounds are normal.      Palpations: Abdomen is soft.      Tenderness: There is no abdominal tenderness.   Musculoskeletal: Normal range of motion.         General: No tenderness. Skin:     General: Skin is warm and dry.   Neurological:      General: No focal deficit present.      Mental Status: Alert and oriented to person, place and time.   Psychiatric:         Attention and Perception: Attention normal.         Mood and Affect: Mood normal.       Assessment/Plan   Diagnoses and all orders for this visit:  Hyperlipidemia, unspecified  - well controlled  - atorvastatin (Lipitor) 10 mg tablet; Take 1 tablet (10 mg) by mouth once daily at bedtime.  Unspecified right bundle-branch block  - chronic  Family history of MI (myocardial infarction)  - lipids well controlled     Follow up 1 year    Current Outpatient Medications on File Prior to Visit   Medication Sig Dispense Refill    multivit-min/ferrous fumarate (MULTI VITAMIN ORAL) Take by mouth.      [DISCONTINUED] atorvastatin (Lipitor) 10 mg tablet TAKE 1 TABLET (10 MG) BY MOUTH ONCE DAILY AT BEDTIME. 90 tablet 0     No current facility-administered medications on file prior to visit.     Exclusive of any other services or procedures performed, Ashley TALBOT, spent 30 minutes in duration for this visit today.  This time consisted of chart review, obtaining history, and/or performing the exam as documented above, as well as, documenting the clinical information for the encounter in the electronic record, discussing treatment options, plans, and/or goals with patient, family, and/or caregiver,  refilling medications, updating the electronic record, ordering medicines, lab work, imaging, referrals, and/or procedures as documented above and communicating with other [facility] professionals. I have discussed the results of laboratory, radiology, and cardiology studies with the patient and their family/caregiver.            stretcher

## 2024-08-28 ENCOUNTER — APPOINTMENT (OUTPATIENT)
Dept: UROLOGY | Facility: CLINIC | Age: 44
End: 2024-08-28

## 2024-12-16 NOTE — ED ADULT NURSE NOTE - CAS EDN DISCHARGE ASSESSMENT
Received refill request from pt [facility] pharmacy for Mounjaro 5 mg.   Alert and oriented to person, place and time

## 2025-02-10 ENCOUNTER — EMERGENCY (EMERGENCY)
Facility: HOSPITAL | Age: 45
LOS: 1 days | Discharge: HOME | End: 2025-02-10
Attending: STUDENT IN AN ORGANIZED HEALTH CARE EDUCATION/TRAINING PROGRAM | Admitting: STUDENT IN AN ORGANIZED HEALTH CARE EDUCATION/TRAINING PROGRAM
Payer: MEDICAID

## 2025-02-10 VITALS
RESPIRATION RATE: 17 BRPM | OXYGEN SATURATION: 99 % | WEIGHT: 218.04 LBS | SYSTOLIC BLOOD PRESSURE: 131 MMHG | HEART RATE: 90 BPM | DIASTOLIC BLOOD PRESSURE: 82 MMHG | TEMPERATURE: 98 F

## 2025-02-10 LAB
ALBUMIN SERPL ELPH-MCNC: 4.2 G/DL — SIGNIFICANT CHANGE UP (ref 3.3–5)
ALP SERPL-CCNC: 90 U/L — SIGNIFICANT CHANGE UP (ref 40–120)
ALT FLD-CCNC: 27 U/L — SIGNIFICANT CHANGE UP (ref 4–41)
ANION GAP SERPL CALC-SCNC: 12 MMOL/L — SIGNIFICANT CHANGE UP (ref 7–14)
AST SERPL-CCNC: 37 U/L — SIGNIFICANT CHANGE UP (ref 4–40)
BASOPHILS # BLD AUTO: 0.06 K/UL — SIGNIFICANT CHANGE UP (ref 0–0.2)
BASOPHILS NFR BLD AUTO: 0.7 % — SIGNIFICANT CHANGE UP (ref 0–2)
BILIRUB SERPL-MCNC: 0.4 MG/DL — SIGNIFICANT CHANGE UP (ref 0.2–1.2)
BUN SERPL-MCNC: 12 MG/DL — SIGNIFICANT CHANGE UP (ref 7–23)
CALCIUM SERPL-MCNC: 8.9 MG/DL — SIGNIFICANT CHANGE UP (ref 8.4–10.5)
CHLORIDE SERPL-SCNC: 104 MMOL/L — SIGNIFICANT CHANGE UP (ref 98–107)
CO2 SERPL-SCNC: 24 MMOL/L — SIGNIFICANT CHANGE UP (ref 22–31)
CREAT SERPL-MCNC: 0.99 MG/DL — SIGNIFICANT CHANGE UP (ref 0.5–1.3)
EGFR: 97 ML/MIN/1.73M2 — SIGNIFICANT CHANGE UP
EOSINOPHIL # BLD AUTO: 0.31 K/UL — SIGNIFICANT CHANGE UP (ref 0–0.5)
EOSINOPHIL NFR BLD AUTO: 3.7 % — SIGNIFICANT CHANGE UP (ref 0–6)
GLUCOSE SERPL-MCNC: 110 MG/DL — HIGH (ref 70–99)
HCT VFR BLD CALC: 45.1 % — SIGNIFICANT CHANGE UP (ref 39–50)
HGB BLD-MCNC: 15.6 G/DL — SIGNIFICANT CHANGE UP (ref 13–17)
HIV 1+2 AB+HIV1 P24 AG SERPL QL IA: SIGNIFICANT CHANGE UP
IANC: 4.58 K/UL — SIGNIFICANT CHANGE UP (ref 1.8–7.4)
IMM GRANULOCYTES NFR BLD AUTO: 0.2 % — SIGNIFICANT CHANGE UP (ref 0–0.9)
LYMPHOCYTES # BLD AUTO: 2.76 K/UL — SIGNIFICANT CHANGE UP (ref 1–3.3)
LYMPHOCYTES # BLD AUTO: 32.6 % — SIGNIFICANT CHANGE UP (ref 13–44)
MCHC RBC-ENTMCNC: 29.3 PG — SIGNIFICANT CHANGE UP (ref 27–34)
MCHC RBC-ENTMCNC: 34.6 G/DL — SIGNIFICANT CHANGE UP (ref 32–36)
MCV RBC AUTO: 84.6 FL — SIGNIFICANT CHANGE UP (ref 80–100)
MONOCYTES # BLD AUTO: 0.74 K/UL — SIGNIFICANT CHANGE UP (ref 0–0.9)
MONOCYTES NFR BLD AUTO: 8.7 % — SIGNIFICANT CHANGE UP (ref 2–14)
NEUTROPHILS # BLD AUTO: 4.58 K/UL — SIGNIFICANT CHANGE UP (ref 1.8–7.4)
NEUTROPHILS NFR BLD AUTO: 54.1 % — SIGNIFICANT CHANGE UP (ref 43–77)
NRBC # BLD AUTO: 0 K/UL — SIGNIFICANT CHANGE UP (ref 0–0)
NRBC # BLD: 0 /100 WBCS — SIGNIFICANT CHANGE UP (ref 0–0)
NRBC # FLD: 0 K/UL — SIGNIFICANT CHANGE UP (ref 0–0)
NRBC BLD-RTO: 0 /100 WBCS — SIGNIFICANT CHANGE UP (ref 0–0)
PLATELET # BLD AUTO: 189 K/UL — SIGNIFICANT CHANGE UP (ref 150–400)
POTASSIUM SERPL-MCNC: 3.6 MMOL/L — SIGNIFICANT CHANGE UP (ref 3.5–5.3)
POTASSIUM SERPL-SCNC: 3.6 MMOL/L — SIGNIFICANT CHANGE UP (ref 3.5–5.3)
PROT SERPL-MCNC: 8 G/DL — SIGNIFICANT CHANGE UP (ref 6–8.3)
RBC # BLD: 5.33 M/UL — SIGNIFICANT CHANGE UP (ref 4.2–5.8)
RBC # FLD: 13.7 % — SIGNIFICANT CHANGE UP (ref 10.3–14.5)
SODIUM SERPL-SCNC: 140 MMOL/L — SIGNIFICANT CHANGE UP (ref 135–145)
WBC # BLD: 8.47 K/UL — SIGNIFICANT CHANGE UP (ref 3.8–10.5)
WBC # FLD AUTO: 8.47 K/UL — SIGNIFICANT CHANGE UP (ref 3.8–10.5)

## 2025-02-10 PROCEDURE — 99284 EMERGENCY DEPT VISIT MOD MDM: CPT

## 2025-02-10 NOTE — ED PROVIDER NOTE - OBJECTIVE STATEMENT
45 y/o male no PMH presents to ER c/o rash to hands/feet/penis  x 3 weeks. Pt. states that 3 weeks ago first noticed rash to neck - states bumps noted to area, took one of his wifes diflucan tablets witout relief - then noticed rash to palms of hands and feet- states ir pruritic in nature. Pt. states feet bumps/rash have resolved but still c/o pruritic bumps/rash to palms. Also patient recently noticed one on lip as well. Pt. also states at the same the rash started on palms/soles - he noticed similar bumps to shaft of penis as well. Pt. denies fever chills nt sweates weight loss. Pt. sexually active with wife only. Does admit to remote history of chlamydia many years ago. Also admits to one arrest one year ago leading to 4-5 days of incarceration at that time but denies prolonged correction stays/activity. Denies penile dsicharge hematuria dysusira weakness cp sob.

## 2025-02-10 NOTE — ED PROVIDER NOTE - PATIENT PORTAL LINK FT
You can access the FollowMyHealth Patient Portal offered by [facility] by registering at the following website: [email or web address]. By joining [facility]’s FollowMyHealth portal, you will also be able to view your health information using other applications (apps) compatible with our system.

## 2025-02-10 NOTE — ED PROVIDER NOTE - ATTENDING APP SHARED VISIT CONTRIBUTION OF CARE
44-year-old male presents to the ED due to a rash that he initially noticed about 6 weeks ago along his beard.  It was pruritic at that time with pustules.  Patient stated that he shaved his beard and then used antifungals and the rash appeared to improve.  However 3 weeks ago he noticed similar kind of rash on his palms as well as penis.  He he states that they have all been pruritic.  He denies any recent travel, new laundry detergents, new close, fevers chills diaphoresis, sloughing of the skin.  He states he has a remote history of chlamydia years ago but none recently.  He was incarcerated about 4 to 5 days in June of last year but nothing recently.    Physical exam:  Overall well-appearing male in no acute distress  Heart is regular rate and rhythm without murmurs or gallops  Lungs are clear to auscultation all lung field without wheeze rales or rhonchi  Abdomen is soft nontender nondistended   exam chaperoned by Briana Parker NP  Patient has skin colored scattered raised papules to the bilateral palms, base of the penis and shaft without vesicles, nontender.  No lymphadenopathy.  He has multiple unscrappable white patches at the inside buccal portion of his mouth, nothing in the oropharynx.    MDM:  Patient presents to the ED due to rash as stated above.  Vital signs are stable and afebrile.  Differential is wide including hand-foot-and-mouth disease, syphilis, otitis, gonorrhea chlamydia, HIV.    Blood work in the ER shows no anemia or leukocytosis.  Normal renal function.  HIV is negative.  Patient discharged close outpatient follow-up to dermatology and infectious diseases.  Strongly advised to refrain from having sexual intercourse until his results come back.    Patient is pending acute viral panel, GC chlamydia, viral swabs, HSV, syphilis.

## 2025-02-10 NOTE — ED PROVIDER NOTE - PHYSICAL EXAMINATION
Gen: Well appearing in NAD  Head: NC/AT  Neck: trachea midline  Resp:  No distress  Ext: no deformities  Neuro:  A&O appears non focal  Skin:  Warm and dry as visualized  Psych:  Normal affect and mood     palms: + skin colored scattered papular rash to bl palms without ttp. no vesicles noted  penis: chaperone NP fellow Briana  + scattered similar papular rash noted to distal penile shaft - no vesicles no bleeding no penile DC at meatus. no lymphadenopathy  mouth: + scattered easily scrapable white/grey spots/patches with mild erythema, airway patent

## 2025-02-10 NOTE — ED ADULT NURSE NOTE - CHIEF COMPLAINT QUOTE
pt reporting to the ED for pruritic papular rash to bilateral hand, sole of feet and judy area X 2 weeks. Denies dysuria, hematuria, abdominal pain, , Fever or chills.

## 2025-02-10 NOTE — ED ADULT NURSE NOTE - OBJECTIVE STATEMENT
patient  Alert & ox4,c/o pruritic papular rash to bilateral hand, sole of feet and judy area X 2 weeks. pt . evaluated by MD., labs drawn and sent.patient will be waiting for results, further evaluation and disposition.  made comfortable.will continue to monitor.

## 2025-02-10 NOTE — ED PROVIDER NOTE - NSFOLLOWUPINSTRUCTIONS_ED_ALL_ED_FT
REST, NO STRENUOUS ACTIVITY  REFRAIN FROM SEXUAL ACTIVITY UNTIL YOURE RESULTS COME BACK   A TEAM MEMBER WILL REACH OUT WHEN RESULTS HAVE FINALIZED  A MEMBER FROM A DISCHARGE LOUNGE WILL ALSO BE IN CONTACT TO HELP EXPEDITE APPOINTMENTS WITH BOTH INFECTIOUS DISEASE AND DERMATOLOGY  RETURN TO ER FOR WORSENING SYMPTOMS      Rash    A rash is a change in the color of the skin. A rash can also change the way your skin feels. There are many different conditions and factors that can cause a rash.     Follow these instructions at home:  Pay attention to any changes in your symptoms. Follow these instructions to help with your condition:    Medicine    Take or apply over-the-counter and prescription medicines only as told by your health care provider. These may include:    Corticosteroid cream.  Anti-itch lotions.  Oral antihistamines.    Skin Care    Apply cool compresses to the affected areas.  Try taking a bath with:  Epsom salts. Follow the instructions on the packaging. You can get these at your local pharmacy or grocery store.  Baking soda. Pour a small amount into the bath as told by your health care provider.  Colloidal oatmeal. Follow the instructions on the packaging. You can get this at your local pharmacy or grocery store.  Try applying baking soda paste to your skin. Stir water into baking soda until it reaches a paste-like consistency.  Do not scratch or rub your skin.  Avoid covering the rash. Make sure the rash is exposed to air as much as possible.    General instructions    Avoid hot showers or baths, which can make itching worse. A cold shower may help.  Avoid scented soaps, detergents, and perfumes. Use gentle soaps, detergents, perfumes, and other cosmetic products.  Avoid any substance that causes your rash. Keep a journal to help track what causes your rash. Write down:  What you eat.  What cosmetic products you use.  What you drink.  What you wear. This includes jewelry.  Keep all follow-up visits as told by your health care provider. This is important.    Contact a health care provider if:  You sweat at night.  You lose weight.  You urinate more than normal.  You feel weak.  You vomit.  Your skin or the whites of your eyes look yellow (jaundice).  Your skin:  Tingles.  Is numb.  Your rash:  Does not go away after several days.  Gets worse.  You are:  Unusually thirsty.  More tired than normal.  You have:  New symptoms.  Pain in your abdomen.  A fever.  Diarrhea.    Get help right away if:  You develop a rash that covers all or most of your body. The rash may or may not be painful.  You develop blisters that:  Are on top of the rash.  Grow larger or grow together.  Are painful.  Are inside your nose or mouth.  You develop a rash that:  Looks like purple pinprick-sized spots all over your body.  Has a “bull's eye” or looks like a target.  Is not related to sun exposure, is red and painful, and causes your skin to peel.    ADDITIONAL NOTES AND INSTRUCTIONS    Please follow up with your Primary MD in 24-48 hr.  Seek immediate medical care for any new/worsening signs or symptoms.     Document Released: 12/8/2003 Document Revised: 5/23/2017 Document Reviewed: 5/4/2016  [employer] Interactive Patient Education ©2019 [employer] This information is not intended to replace advice given to you by your health care provider. Make sure you discuss any questions you have with your health care provider.    [facility]  Dermatología  [address] [place] [address]  Teléfono: [phone]  Fax: [phone]    [facility]  [facility]  [address] [address]  Teléfono: [phone]  Fax: [phone]    [facility]  Dermatología  [address] [address]  Teléfono: [phone]  Fax:  Tiempo de seguimiento:    [facility]  Dermatología  [address] [place] [address]  Teléfono: [phone]  Fax: [phone]  Tiempo de seguimiento:    Erupción    Pastora erupción es un cambio en el color de la piel. Pastora erupción también puede cambiar la forma en que se siente la piel. Hay muchas condiciones y factores diferentes que pueden causar pastora erupción.    Siga estas instrucciones en casa:  Preste atención a cualquier cambio en darnell síntomas. Siga estas instrucciones para ayudar con mendoza condición:    Medicamento    [place] o aplique medicamentos de venta [place] y recetados solo según lo indique mendoza proveedor de atención médica. Estos pueden incluir:    Crema de corticosteroides.  Lociones anti-picazón.  Antihistamínicos orales.    Protección de la piel    Aplique compresas frías en las áreas afectadas.  Intente veronica un baño con:  Sales de Epsom. Siga las instrucciones del paquete. Puede conseguirlos en mendoza farmacia o supermercado local.  Bicarbonato de sodio. Vierta pastora pequeña cantidad en el baño según le indique mendoza proveedor de atención médica.  Romina coloidal. Siga las instrucciones del paquete. Puede conseguirlo en mendoza farmacia o supermercado local.  Intente aplicar pasta de bicarbonato de sodio en mendoza piel. Agrega agua al bicarbonato de sodio hasta que tenga pastora consistencia pastosa.  No se rasque ni frote la piel.  Evite cubrir la erupción. Asegúrese de que la erupción esté expuesta al aire tanto mary sea posible.    Instrucciones generales    Evite las duchas o mahin calientes, que pueden empeorar la picazón. Pastora ducha fría puede ayudar.  Evite los jabones, detergentes y perfumes perfumados. Use jabones, detergentes, perfumes y otros productos cosméticos suaves.  Evite cualquier sustancia que le cause sarpullido. Lleve un diario para ayudar a rastrear las causas de mendoza erupción. Anote:  Lo que comes.  Qué productos cosméticos usas.  Que estas tomando.  Lo que vistes. [place] incluye joyas.  Asista a todas las visitas de seguimiento que le indique mendoza proveedor de atención médica. [place] es importante.    Comuníquese con un proveedor de atención médica si:  Sudas de noche.  Tú pierdes peso.  Orinas más de lo normal.  Te sientes débil.  Vomitas.  Mendoza piel o el valdez de darnell ojos se demetra amarillos (ictericia).  Tu piel:  Hormigueo  Está entumecido.  Tu sarpullido:  No desaparece después de varios días.  Empeora.  Usted está:  Inusualmente sediento.  Más cansado de lo normal.  Tu tienes:  Nuevos síntomas.  Dolor en mendoza abdomen.  Fiebre  Diarrea.    Obtenga ayuda de inmediato si:  Desarrolla pastora erupción que cubre todo o la mayor parte de mendoza cuerpo. La erupción puede ser dolorosa o no.  Desarrolla ampollas que:  Están encima del sarpullido.  Crezcan más o crezcan juntos.  Son dolorosos.  Están dentro de mendoza nariz o boca.  Desarrolla pastora erupción que:  Parece manchas moradas del tamaño de un alfiler en todo el cuerpo.  Tiene un "lindy de buey" o parece un objetivo.  No está relacionado con la exposición al sol, es london y doloroso, y hace que mendoza piel se pele.    NOTAS E INSTRUCCIONES ADICIONALES    Lisa un seguimiento con mendoza médico de cabecera en 24-48 horas.  Busque atención médica inmediata ante cualquier signo o síntoma nuevo o que empeore.    Documento publicado: 8/12/2003 Documento revisado: 23/5/2017 Documento revisado: 4/5/2016  Educación interactiva para el paciente de [employer] © 2019 [employer]. Esta información no pretende reemplazar los consejos que le haya brindado mendoza proveedor de atención médica. Asegúrese de discutir cualquier pregunta que tenga con mendoza proveedor de atención médica.

## 2025-02-10 NOTE — ED PROVIDER NOTE - CLINICAL SUMMARY MEDICAL DECISION MAKING FREE TEXT BOX
45 y/o male presenting to ER c/o pruritic rash to palms/soles and penile shaft x 3 weeks  -unclear etiology - r/o viral causes (HSV, HIV) vs possible hand/foot/mouth - r/o syhphillis as well as hepatitis/ gc/chalmydia  -labs hsv studies, HIV syphillis screen gc/chalmydia, viral culture  -outpt derm and ID follow up -will expedite through nikki silverio

## 2025-02-10 NOTE — ED ADULT NURSE NOTE - NSFALLRISKFACTORS_ED_ALL_ED
Peripheral Block    Patient location during procedure: procedure area  Reason for block: post-op pain management, primary anesthetic and at surgeon's request  Start time: 10/16/2024 7:04 AM  End time: 10/16/2024 7:12 AM  Staffing  Performed: anesthesiologist   Performed by: Reuben Pierre MD  Authorized by: Reuben Pierre MD    Preanesthetic Checklist  Completed: patient identified, IV checked, site marked, risks and benefits discussed, surgical/procedural consents, equipment checked, pre-op evaluation, timeout performed, anesthesia consent given, oxygen available, monitors applied/VS acknowledged, fire risk safety assessment completed and verbalized and blood product R/B/A discussed and consented  Peripheral Block   Patient position: supine  Prep: ChloraPrep  Provider prep: mask, sterile gloves and sterile gown  Patient monitoring: cardiac monitor, continuous pulse ox, frequent blood pressure checks, IV access, oxygen and responsive to questions  Block type: Brachial plexus  Supraclavicular  Laterality: left  Injection technique: single-shot  Guidance: ultrasound guided    Needle   Needle type: insulated echogenic nerve stimulator needle   Needle gauge: 22 G  Needle localization: anatomical landmarks and ultrasound guidance  Assessment   Injection assessment: negative aspiration for heme, no paresthesia on injection, local visualized surrounding nerve on ultrasound and no intravascular symptoms  Paresthesia pain: none  Slow fractionated injection: yes  Hemodynamics: stable  Outcomes: uncomplicated and patient tolerated procedure well            
No indicators present

## 2025-02-11 LAB
C TRACH RRNA SPEC QL NAA+PROBE: SIGNIFICANT CHANGE UP
HAV IGM SER-ACNC: SIGNIFICANT CHANGE UP
HBV CORE IGM SER-ACNC: SIGNIFICANT CHANGE UP
HBV SURFACE AG SER-ACNC: SIGNIFICANT CHANGE UP
HCV AB S/CO SERPL IA: 0.16 S/CO — SIGNIFICANT CHANGE UP (ref 0–0.99)
HCV AB SERPL-IMP: SIGNIFICANT CHANGE UP
HSV 1/2 SOURCE: SIGNIFICANT CHANGE UP
HSV1 DNA BLD QL NAA+PROBE: SIGNIFICANT CHANGE UP
HSV2 DNA BLD QL NAA+PROBE: SIGNIFICANT CHANGE UP
N GONORRHOEA RRNA SPEC QL NAA+PROBE: SIGNIFICANT CHANGE UP
SPECIMEN SOURCE: SIGNIFICANT CHANGE UP
T PALLIDUM AB TITR SER: NEGATIVE — SIGNIFICANT CHANGE UP

## 2025-02-11 NOTE — ED POST DISCHARGE NOTE - DETAILS
Sundeep PGY3: Patient called to inquire results of remaining labs. Informed him that he was positive for herpes virus 6 and negative for GC/Chlamydia. Will follow up with infectious disease appointment

## 2025-02-11 NOTE — ED POST DISCHARGE NOTE - RESULT SUMMARY
GARY Dumont telephon request to call patient back with HIV and syphillus result which were noth negative.   Told patient both were negaative.  At this time he stil had a chlamydia and herpes pending.  Notified him to call us in 48 hours to see if results final

## 2025-02-13 LAB — HHV8 DNA SERPL QL NAA+PROBE: SIGNIFICANT CHANGE UP COPIES/ML

## 2025-02-14 LAB
HERPES VIRUS 6 ANTIBODIES INTERPRETATION: SIGNIFICANT CHANGE UP
HHV6 IGM SER-ACNC: HIGH
HHV6 IGM SER-ACNC: SIGNIFICANT CHANGE UP

## 2025-02-20 LAB — VIRUS SPEC CULT: SIGNIFICANT CHANGE UP

## 2025-02-21 ENCOUNTER — APPOINTMENT (OUTPATIENT)
Dept: INFECTIOUS DISEASE | Facility: CLINIC | Age: 45
End: 2025-02-21

## 2025-02-21 ENCOUNTER — OUTPATIENT (OUTPATIENT)
Dept: OUTPATIENT SERVICES | Facility: HOSPITAL | Age: 45
LOS: 1 days | End: 2025-02-21
Payer: MEDICAID

## 2025-02-21 VITALS
TEMPERATURE: 97.8 F | BODY MASS INDEX: 39.94 KG/M2 | WEIGHT: 279 LBS | HEART RATE: 98 BPM | HEIGHT: 70 IN | SYSTOLIC BLOOD PRESSURE: 135 MMHG | OXYGEN SATURATION: 97 % | DIASTOLIC BLOOD PRESSURE: 80 MMHG

## 2025-02-21 DIAGNOSIS — R21 RASH AND OTHER NONSPECIFIC SKIN ERUPTION: ICD-10-CM

## 2025-02-21 DIAGNOSIS — B97.89 OTHER VIRAL AGENTS AS THE CAUSE OF DISEASES CLASSIFIED ELSEWHERE: ICD-10-CM

## 2025-02-21 DIAGNOSIS — L43.9 LICHEN PLANUS, UNSPECIFIED: ICD-10-CM

## 2025-02-21 PROCEDURE — G0463: CPT

## 2025-02-21 PROCEDURE — 99203 OFFICE O/P NEW LOW 30 MIN: CPT

## 2025-02-28 DIAGNOSIS — R21 RASH AND OTHER NONSPECIFIC SKIN ERUPTION: ICD-10-CM

## 2025-02-28 DIAGNOSIS — L43.9 LICHEN PLANUS, UNSPECIFIED: ICD-10-CM

## 2025-07-18 ENCOUNTER — EMERGENCY (EMERGENCY)
Facility: HOSPITAL | Age: 45
LOS: 0 days | Discharge: HOME | End: 2025-07-18
Attending: STUDENT IN AN ORGANIZED HEALTH CARE EDUCATION/TRAINING PROGRAM
Payer: MEDICAID

## 2025-07-18 VITALS
TEMPERATURE: 98 F | OXYGEN SATURATION: 95 % | HEART RATE: 103 BPM | HEIGHT: 69 IN | WEIGHT: 259.93 LBS | SYSTOLIC BLOOD PRESSURE: 119 MMHG | DIASTOLIC BLOOD PRESSURE: 85 MMHG | RESPIRATION RATE: 20 BRPM

## 2025-07-18 DIAGNOSIS — M79.651 PAIN IN RIGHT THIGH: ICD-10-CM

## 2025-07-18 DIAGNOSIS — M25.561 PAIN IN RIGHT KNEE: ICD-10-CM
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
## 2025-07-18 DIAGNOSIS — I10 ESSENTIAL (PRIMARY) HYPERTENSION: ICD-10-CM
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
## 2025-07-18 RX ORDER — KETOROLAC TROMETHAMINE 30 MG/ML
30 INJECTION, SOLUTION INTRAMUSCULAR; INTRAVENOUS ONCE
Refills: 0 | Status: DISCONTINUED | OUTPATIENT
Start: 2025-07-18 | End: 2025-07-18

## 2025-07-18 RX ADMIN — KETOROLAC TROMETHAMINE 30 MILLIGRAM(S): 30 INJECTION, SOLUTION INTRAMUSCULAR; INTRAVENOUS at 05:43

## 2025-07-18 NOTE — ED PROVIDER NOTE - OBJECTIVE STATEMENT
45 M pmh HTN presenting to the ED after slipping and falling down concrete stairs. Patient states that he slipped and had to catch himself but his L knee went backwards and he heard a pop. He has complaints of pain over the anterior lower thigh

## 2025-07-18 NOTE — ED PROVIDER NOTE - NSFOLLOWUPINSTRUCTIONS_ED_ALL_ED_FT
There was no fracture on the imaging tests. continue to use the ace wrap and brace as needed. Be sure to follow-up with your primary doctor

## 2025-07-18 NOTE — ED ADULT TRIAGE NOTE - CHIEF COMPLAINT QUOTE
BIBEMS from home. Pt s/p fall down stairs 10 steps. Pt c/o  left knee pain. pmhx HTN. No blood thinner. No head strike. NKDA

## 2025-07-18 NOTE — ED PROVIDER NOTE - CLINICAL SUMMARY MEDICAL DECISION MAKING FREE TEXT BOX
male presenting to the ED after slipped down stairs.    L knee ttp, mild swelling  concern for MSK injury r/o fracture, possible ligamentous injury  pain control  XR knee, femur  dispo home w/ ortho follow-up

## 2025-07-18 NOTE — ED ADULT TRIAGE NOTE - AS HEIGHT TYPE
September 27, 2022     Patient: Evaristo Arizmendi   YOB: 2018   Date of Visit: 9/27/2022       To Whom it May Concern:    Evaristo Arizmendi was seen in my clinic on 9/27/2022 at 8:45 am.     Please excuse Evaristo for his absence from school on the date listed above. Please excuse Mr. Arizmendi for his absence on 09/27 and 09/28. He may return to school on 09/29.    Sincerely,         Althea Robert MD    Medical information is confidential and cannot be disclosed without the written consent of the patient or his representative.      
stated

## 2025-07-18 NOTE — ED PROVIDER NOTE - CARE PROVIDER_API CALL
Darci Birch)  Orthopaedic Surgery  [address], [address]  [place] [address]  Phone: [phone]  Fax: [phone]  Follow Up Time: Urgent

## 2025-07-18 NOTE — ED ADULT NURSE NOTE - NSFALLRISKINTERV_ED_ALL_ED

## 2025-07-18 NOTE — ED PROVIDER NOTE - PHYSICAL EXAMINATION
General: Well appearing female in no acute distress  HEENT: Normocephalic, atraumatic. Moist mucous membranes. Oropharynx clear. No lymphadenopathy.  Eyes: No scleral icterus. EOMI. SHAYY.  Neck:. Soft and supple. Full ROM without pain. No midline tenderness  Cardiac: Regular rate and regular rhythm. No murmurs, rubs, gallops. Peripheral pulses 2+ and symmetric. No LE edema.  Resp: Lungs CTAB. Speaking in full sentences. No wheezes, rales or rhonchi.  Abd: Soft, non-tender, non-distended. No guarding or rebound. No scars, masses, or lesions.  Back: Spine midline and non-tender. No CVA tenderness.   MSK: FROM over L knee, ttp along anterior inferior thigh.   Skin: No rashes, abrasions, or lacerations.  Neuro: AO x 3. Moves all extremities symmetrically. Motor strength and sensation grossly intact.

## 2025-07-18 NOTE — ED ADULT NURSE NOTE - OBJECTIVE STATEMENT
45Y A&OX3 M PMH HTN c/o L leg pain s/p fall down stairs. pt states he missed a step and fell down about 10 stairs, bending his L leg backwards. pt states he felt something "pop."  complains of slight numbness and tingling. denies back pain.

## 2025-07-18 NOTE — ED PROVIDER NOTE - NS ED ROS FT
General: Denies fever, chills  HEENT: Denies sensory changes, sore throat  Neck: Denies neck pain, neck stiffness  Resp: Denies coughing, SOB  Cardiovascular: Denies CP, palpitations, LE edema  GI: Denies nausea, vomiting, abdominal pain, diarrhea, constipation, blood in stool  : Denies dysuria, hematuria, frequency, incontinence  MSK: + knee pain, sweeling  Neuro: Denies HA, dizziness, numbness, weakness  Skin: Denies rashes.

## 2025-07-22 ENCOUNTER — APPOINTMENT (OUTPATIENT)
Age: 45
End: 2025-07-22
Payer: MEDICAID

## 2025-07-22 VITALS — WEIGHT: 279 LBS | BODY MASS INDEX: 39.94 KG/M2 | HEIGHT: 70 IN

## 2025-07-22 DIAGNOSIS — S76.112A STRAIN OF LEFT QUADRICEPS MUSCLE, FASCIA AND TENDON, INITIAL ENCOUNTER: ICD-10-CM

## 2025-07-22 PROCEDURE — 99203 OFFICE O/P NEW LOW 30 MIN: CPT

## 2025-07-30 ENCOUNTER — APPOINTMENT (OUTPATIENT)
Dept: MRI IMAGING | Facility: CLINIC | Age: 45
End: 2025-07-30
Payer: MEDICAID

## 2025-07-30 PROCEDURE — 73721 MRI JNT OF LWR EXTRE W/O DYE: CPT | Mod: LT

## 2025-07-31 ENCOUNTER — NON-APPOINTMENT (OUTPATIENT)
Age: 45
End: 2025-07-31